# Patient Record
Sex: MALE | URBAN - METROPOLITAN AREA
[De-identification: names, ages, dates, MRNs, and addresses within clinical notes are randomized per-mention and may not be internally consistent; named-entity substitution may affect disease eponyms.]

---

## 2020-08-07 ENCOUNTER — HOSPITAL ENCOUNTER (OUTPATIENT)
Dept: LAB | Facility: MEDICAL CENTER | Age: 55
End: 2020-08-07
Payer: COMMERCIAL

## 2020-08-07 LAB
COVID ORDER STATUS COVID19: NORMAL
SARS-COV-2 RNA RESP QL NAA+PROBE: NOTDETECTED
SPECIMEN SOURCE: NORMAL

## 2024-06-16 ENCOUNTER — APPOINTMENT (OUTPATIENT)
Dept: RADIOLOGY | Facility: MEDICAL CENTER | Age: 59
DRG: 501 | End: 2024-06-16
Attending: BEHAVIOR ANALYST
Payer: COMMERCIAL

## 2024-06-16 ENCOUNTER — HOSPITAL ENCOUNTER (INPATIENT)
Facility: MEDICAL CENTER | Age: 59
LOS: 4 days | DRG: 501 | End: 2024-06-20
Attending: EMERGENCY MEDICINE | Admitting: FAMILY MEDICINE
Payer: COMMERCIAL

## 2024-06-16 ENCOUNTER — OFFICE VISIT (OUTPATIENT)
Dept: URGENT CARE | Facility: CLINIC | Age: 59
End: 2024-06-16
Payer: COMMERCIAL

## 2024-06-16 VITALS
DIASTOLIC BLOOD PRESSURE: 60 MMHG | OXYGEN SATURATION: 96 % | SYSTOLIC BLOOD PRESSURE: 114 MMHG | HEIGHT: 72 IN | WEIGHT: 262 LBS | RESPIRATION RATE: 13 BRPM | HEART RATE: 101 BPM | TEMPERATURE: 97.6 F | BODY MASS INDEX: 35.49 KG/M2

## 2024-06-16 DIAGNOSIS — L03.116 LEFT LEG CELLULITIS: ICD-10-CM

## 2024-06-16 DIAGNOSIS — L03.116 CELLULITIS OF LEFT LOWER EXTREMITY: ICD-10-CM

## 2024-06-16 DIAGNOSIS — R50.9 FEVER, UNSPECIFIED FEVER CAUSE: ICD-10-CM

## 2024-06-16 PROBLEM — L03.90 CELLULITIS: Status: ACTIVE | Noted: 2024-06-16

## 2024-06-16 LAB
ALBUMIN SERPL BCP-MCNC: 3.6 G/DL (ref 3.2–4.9)
ALBUMIN/GLOB SERPL: 1.1 G/DL
ALP SERPL-CCNC: 72 U/L (ref 30–99)
ALT SERPL-CCNC: 13 U/L (ref 2–50)
ANION GAP SERPL CALC-SCNC: 11 MMOL/L (ref 7–16)
AST SERPL-CCNC: 17 U/L (ref 12–45)
BASOPHILS # BLD AUTO: 0.2 % (ref 0–1.8)
BASOPHILS # BLD: 0.03 K/UL (ref 0–0.12)
BILIRUB SERPL-MCNC: 0.9 MG/DL (ref 0.1–1.5)
BUN SERPL-MCNC: 14 MG/DL (ref 8–22)
CALCIUM ALBUM COR SERPL-MCNC: 9.3 MG/DL (ref 8.5–10.5)
CALCIUM SERPL-MCNC: 9 MG/DL (ref 8.5–10.5)
CHLORIDE SERPL-SCNC: 104 MMOL/L (ref 96–112)
CO2 SERPL-SCNC: 23 MMOL/L (ref 20–33)
CREAT SERPL-MCNC: 0.78 MG/DL (ref 0.5–1.4)
CRP SERPL HS-MCNC: 23.88 MG/DL (ref 0–0.75)
EOSINOPHIL # BLD AUTO: 0.12 K/UL (ref 0–0.51)
EOSINOPHIL NFR BLD: 0.7 % (ref 0–6.9)
ERYTHROCYTE [DISTWIDTH] IN BLOOD BY AUTOMATED COUNT: 39.8 FL (ref 35.9–50)
GFR SERPLBLD CREATININE-BSD FMLA CKD-EPI: 103 ML/MIN/1.73 M 2
GLOBULIN SER CALC-MCNC: 3.2 G/DL (ref 1.9–3.5)
GLUCOSE SERPL-MCNC: 117 MG/DL (ref 65–99)
HCT VFR BLD AUTO: 40.7 % (ref 42–52)
HGB BLD-MCNC: 14.1 G/DL (ref 14–18)
IMM GRANULOCYTES # BLD AUTO: 0.06 K/UL (ref 0–0.11)
IMM GRANULOCYTES NFR BLD AUTO: 0.4 % (ref 0–0.9)
LYMPHOCYTES # BLD AUTO: 1.06 K/UL (ref 1–4.8)
LYMPHOCYTES NFR BLD: 6.5 % (ref 22–41)
MCH RBC QN AUTO: 30.3 PG (ref 27–33)
MCHC RBC AUTO-ENTMCNC: 34.6 G/DL (ref 32.3–36.5)
MCV RBC AUTO: 87.5 FL (ref 81.4–97.8)
MONOCYTES # BLD AUTO: 1.45 K/UL (ref 0–0.85)
MONOCYTES NFR BLD AUTO: 8.9 % (ref 0–13.4)
NEUTROPHILS # BLD AUTO: 13.59 K/UL (ref 1.82–7.42)
NEUTROPHILS NFR BLD: 83.3 % (ref 44–72)
NRBC # BLD AUTO: 0 K/UL
NRBC BLD-RTO: 0 /100 WBC (ref 0–0.2)
PLATELET # BLD AUTO: 200 K/UL (ref 164–446)
PMV BLD AUTO: 10.2 FL (ref 9–12.9)
POTASSIUM SERPL-SCNC: 3.5 MMOL/L (ref 3.6–5.5)
PROT SERPL-MCNC: 6.8 G/DL (ref 6–8.2)
RBC # BLD AUTO: 4.65 M/UL (ref 4.7–6.1)
SODIUM SERPL-SCNC: 138 MMOL/L (ref 135–145)
WBC # BLD AUTO: 16.3 K/UL (ref 4.8–10.8)

## 2024-06-16 PROCEDURE — 99285 EMERGENCY DEPT VISIT HI MDM: CPT

## 2024-06-16 PROCEDURE — 700111 HCHG RX REV CODE 636 W/ 250 OVERRIDE (IP): Performed by: EMERGENCY MEDICINE

## 2024-06-16 PROCEDURE — 36415 COLL VENOUS BLD VENIPUNCTURE: CPT

## 2024-06-16 PROCEDURE — 3078F DIAST BP <80 MM HG: CPT | Performed by: PHYSICIAN ASSISTANT

## 2024-06-16 PROCEDURE — 700111 HCHG RX REV CODE 636 W/ 250 OVERRIDE (IP): Mod: JZ | Performed by: BEHAVIOR ANALYST

## 2024-06-16 PROCEDURE — 3074F SYST BP LT 130 MM HG: CPT | Performed by: PHYSICIAN ASSISTANT

## 2024-06-16 PROCEDURE — 80053 COMPREHEN METABOLIC PANEL: CPT

## 2024-06-16 PROCEDURE — 700105 HCHG RX REV CODE 258: Performed by: EMERGENCY MEDICINE

## 2024-06-16 PROCEDURE — 87641 MR-STAPH DNA AMP PROBE: CPT

## 2024-06-16 PROCEDURE — 99205 OFFICE O/P NEW HI 60 MIN: CPT | Performed by: PHYSICIAN ASSISTANT

## 2024-06-16 PROCEDURE — 99222 1ST HOSP IP/OBS MODERATE 55: CPT | Mod: GC | Performed by: FAMILY MEDICINE

## 2024-06-16 PROCEDURE — 700105 HCHG RX REV CODE 258: Performed by: BEHAVIOR ANALYST

## 2024-06-16 PROCEDURE — 73590 X-RAY EXAM OF LOWER LEG: CPT | Mod: LT

## 2024-06-16 PROCEDURE — 86140 C-REACTIVE PROTEIN: CPT

## 2024-06-16 PROCEDURE — 85025 COMPLETE CBC W/AUTO DIFF WBC: CPT

## 2024-06-16 PROCEDURE — 87040 BLOOD CULTURE FOR BACTERIA: CPT

## 2024-06-16 PROCEDURE — 96365 THER/PROPH/DIAG IV INF INIT: CPT

## 2024-06-16 PROCEDURE — 700102 HCHG RX REV CODE 250 W/ 637 OVERRIDE(OP): Performed by: BEHAVIOR ANALYST

## 2024-06-16 PROCEDURE — A9270 NON-COVERED ITEM OR SERVICE: HCPCS | Performed by: BEHAVIOR ANALYST

## 2024-06-16 PROCEDURE — 770006 HCHG ROOM/CARE - MED/SURG/GYN SEMI*

## 2024-06-16 RX ORDER — METAXALONE 800 MG/1
800 TABLET ORAL 3 TIMES DAILY
COMMUNITY
Start: 2024-06-10

## 2024-06-16 RX ORDER — SODIUM CHLORIDE 9 MG/ML
500 INJECTION, SOLUTION INTRAVENOUS ONCE
Status: COMPLETED | OUTPATIENT
Start: 2024-06-16 | End: 2024-06-17

## 2024-06-16 RX ORDER — IBUPROFEN 200 MG
800 TABLET ORAL EVERY 8 HOURS PRN
COMMUNITY

## 2024-06-16 RX ORDER — ACETAMINOPHEN 325 MG/1
650 TABLET ORAL EVERY 6 HOURS PRN
Status: DISCONTINUED | OUTPATIENT
Start: 2024-06-16 | End: 2024-06-20 | Stop reason: HOSPADM

## 2024-06-16 RX ORDER — IBUPROFEN 400 MG/1
400 TABLET ORAL EVERY 6 HOURS PRN
Status: DISCONTINUED | OUTPATIENT
Start: 2024-06-16 | End: 2024-06-20 | Stop reason: HOSPADM

## 2024-06-16 RX ORDER — ENOXAPARIN SODIUM 100 MG/ML
40 INJECTION SUBCUTANEOUS DAILY
Status: DISCONTINUED | OUTPATIENT
Start: 2024-06-16 | End: 2024-06-20 | Stop reason: HOSPADM

## 2024-06-16 RX ADMIN — CEFAZOLIN 2 G: 2 INJECTION, POWDER, FOR SOLUTION INTRAMUSCULAR; INTRAVENOUS at 18:13

## 2024-06-16 RX ADMIN — SODIUM CHLORIDE 500 ML: 9 INJECTION, SOLUTION INTRAVENOUS at 23:37

## 2024-06-16 RX ADMIN — ACETAMINOPHEN 650 MG: 325 TABLET, FILM COATED ORAL at 23:33

## 2024-06-16 RX ADMIN — ENOXAPARIN SODIUM 40 MG: 100 INJECTION SUBCUTANEOUS at 21:08

## 2024-06-16 SDOH — ECONOMIC STABILITY: TRANSPORTATION INSECURITY
IN THE PAST 12 MONTHS, HAS LACK OF RELIABLE TRANSPORTATION KEPT YOU FROM MEDICAL APPOINTMENTS, MEETINGS, WORK OR FROM GETTING THINGS NEEDED FOR DAILY LIVING?: NO

## 2024-06-16 SDOH — ECONOMIC STABILITY: TRANSPORTATION INSECURITY
IN THE PAST 12 MONTHS, HAS THE LACK OF TRANSPORTATION KEPT YOU FROM MEDICAL APPOINTMENTS OR FROM GETTING MEDICATIONS?: NO

## 2024-06-16 ASSESSMENT — ENCOUNTER SYMPTOMS
VOMITING: 0
EYE PAIN: 0
NAUSEA: 0
COUGH: 0
CHILLS: 0
SORE THROAT: 0
FEVER: 1
MYALGIAS: 0
ABDOMINAL PAIN: 0
HEADACHES: 0
DIARRHEA: 0
SHORTNESS OF BREATH: 0
CONSTIPATION: 0

## 2024-06-16 ASSESSMENT — COGNITIVE AND FUNCTIONAL STATUS - GENERAL
DAILY ACTIVITIY SCORE: 24
SUGGESTED CMS G CODE MODIFIER MOBILITY: CH
SUGGESTED CMS G CODE MODIFIER DAILY ACTIVITY: CH
MOBILITY SCORE: 24

## 2024-06-16 ASSESSMENT — SOCIAL DETERMINANTS OF HEALTH (SDOH)
WITHIN THE LAST YEAR, HAVE YOU BEEN HUMILIATED OR EMOTIONALLY ABUSED IN OTHER WAYS BY YOUR PARTNER OR EX-PARTNER?: NO
IN THE PAST 12 MONTHS, HAS THE ELECTRIC, GAS, OIL, OR WATER COMPANY THREATENED TO SHUT OFF SERVICE IN YOUR HOME?: NO
WITHIN THE LAST YEAR, HAVE TO BEEN RAPED OR FORCED TO HAVE ANY KIND OF SEXUAL ACTIVITY BY YOUR PARTNER OR EX-PARTNER?: NO
WITHIN THE PAST 12 MONTHS, YOU WORRIED THAT YOUR FOOD WOULD RUN OUT BEFORE YOU GOT THE MONEY TO BUY MORE: NEVER TRUE
WITHIN THE PAST 12 MONTHS, THE FOOD YOU BOUGHT JUST DIDN'T LAST AND YOU DIDN'T HAVE MONEY TO GET MORE: NEVER TRUE
WITHIN THE LAST YEAR, HAVE YOU BEEN AFRAID OF YOUR PARTNER OR EX-PARTNER?: NO
WITHIN THE LAST YEAR, HAVE YOU BEEN KICKED, HIT, SLAPPED, OR OTHERWISE PHYSICALLY HURT BY YOUR PARTNER OR EX-PARTNER?: NO

## 2024-06-16 ASSESSMENT — LIFESTYLE VARIABLES
CONSUMPTION TOTAL: NEGATIVE
EVER HAD A DRINK FIRST THING IN THE MORNING TO STEADY YOUR NERVES TO GET RID OF A HANGOVER: NO
HOW MANY TIMES IN THE PAST YEAR HAVE YOU HAD 5 OR MORE DRINKS IN A DAY: 0
HAVE PEOPLE ANNOYED YOU BY CRITICIZING YOUR DRINKING: NO
ALCOHOL_USE: NO
TOTAL SCORE: 0
ON A TYPICAL DAY WHEN YOU DRINK ALCOHOL HOW MANY DRINKS DO YOU HAVE: 0
AVERAGE NUMBER OF DAYS PER WEEK YOU HAVE A DRINK CONTAINING ALCOHOL: 0
TOTAL SCORE: 0
TOTAL SCORE: 0
HAVE YOU EVER FELT YOU SHOULD CUT DOWN ON YOUR DRINKING: NO
EVER FELT BAD OR GUILTY ABOUT YOUR DRINKING: NO

## 2024-06-16 ASSESSMENT — PATIENT HEALTH QUESTIONNAIRE - PHQ9
1. LITTLE INTEREST OR PLEASURE IN DOING THINGS: NOT AT ALL
2. FEELING DOWN, DEPRESSED, IRRITABLE, OR HOPELESS: NOT AT ALL
SUM OF ALL RESPONSES TO PHQ9 QUESTIONS 1 AND 2: 0

## 2024-06-16 ASSESSMENT — PAIN DESCRIPTION - PAIN TYPE
TYPE: ACUTE PAIN
TYPE: ACUTE PAIN

## 2024-06-17 ENCOUNTER — APPOINTMENT (OUTPATIENT)
Dept: RADIOLOGY | Facility: MEDICAL CENTER | Age: 59
DRG: 501 | End: 2024-06-17
Payer: COMMERCIAL

## 2024-06-17 PROBLEM — T78.40XA HYPERSENSITIVITY REACTION: Status: ACTIVE | Noted: 2024-06-17

## 2024-06-17 LAB
ANION GAP SERPL CALC-SCNC: 10 MMOL/L (ref 7–16)
BASOPHILS # BLD AUTO: 0.3 % (ref 0–1.8)
BASOPHILS # BLD: 0.04 K/UL (ref 0–0.12)
BUN SERPL-MCNC: 12 MG/DL (ref 8–22)
CALCIUM SERPL-MCNC: 8.6 MG/DL (ref 8.5–10.5)
CHLORIDE SERPL-SCNC: 106 MMOL/L (ref 96–112)
CO2 SERPL-SCNC: 20 MMOL/L (ref 20–33)
CREAT SERPL-MCNC: 0.74 MG/DL (ref 0.5–1.4)
EOSINOPHIL # BLD AUTO: 0.12 K/UL (ref 0–0.51)
EOSINOPHIL NFR BLD: 0.9 % (ref 0–6.9)
ERYTHROCYTE [DISTWIDTH] IN BLOOD BY AUTOMATED COUNT: 39 FL (ref 35.9–50)
GFR SERPLBLD CREATININE-BSD FMLA CKD-EPI: 105 ML/MIN/1.73 M 2
GLUCOSE SERPL-MCNC: 105 MG/DL (ref 65–99)
HCT VFR BLD AUTO: 39.6 % (ref 42–52)
HGB BLD-MCNC: 14 G/DL (ref 14–18)
IMM GRANULOCYTES # BLD AUTO: 0.08 K/UL (ref 0–0.11)
IMM GRANULOCYTES NFR BLD AUTO: 0.6 % (ref 0–0.9)
LYMPHOCYTES # BLD AUTO: 1.1 K/UL (ref 1–4.8)
LYMPHOCYTES NFR BLD: 8.3 % (ref 22–41)
MCH RBC QN AUTO: 30.4 PG (ref 27–33)
MCHC RBC AUTO-ENTMCNC: 35.4 G/DL (ref 32.3–36.5)
MCV RBC AUTO: 85.9 FL (ref 81.4–97.8)
MONOCYTES # BLD AUTO: 1.07 K/UL (ref 0–0.85)
MONOCYTES NFR BLD AUTO: 8.1 % (ref 0–13.4)
NEUTROPHILS # BLD AUTO: 10.83 K/UL (ref 1.82–7.42)
NEUTROPHILS NFR BLD: 81.8 % (ref 44–72)
NRBC # BLD AUTO: 0 K/UL
NRBC BLD-RTO: 0 /100 WBC (ref 0–0.2)
PLATELET # BLD AUTO: 203 K/UL (ref 164–446)
PMV BLD AUTO: 10.5 FL (ref 9–12.9)
POTASSIUM SERPL-SCNC: 3.5 MMOL/L (ref 3.6–5.5)
RBC # BLD AUTO: 4.61 M/UL (ref 4.7–6.1)
SCCMEC + MECA PNL NOSE NAA+PROBE: NEGATIVE
SODIUM SERPL-SCNC: 136 MMOL/L (ref 135–145)
WBC # BLD AUTO: 13.2 K/UL (ref 4.8–10.8)

## 2024-06-17 PROCEDURE — 700105 HCHG RX REV CODE 258: Performed by: BEHAVIOR ANALYST

## 2024-06-17 PROCEDURE — 36415 COLL VENOUS BLD VENIPUNCTURE: CPT

## 2024-06-17 PROCEDURE — 76882 US LMTD JT/FCL EVL NVASC XTR: CPT | Mod: LT

## 2024-06-17 PROCEDURE — A9270 NON-COVERED ITEM OR SERVICE: HCPCS | Performed by: BEHAVIOR ANALYST

## 2024-06-17 PROCEDURE — A9270 NON-COVERED ITEM OR SERVICE: HCPCS

## 2024-06-17 PROCEDURE — 85025 COMPLETE CBC W/AUTO DIFF WBC: CPT

## 2024-06-17 PROCEDURE — 76705 ECHO EXAM OF ABDOMEN: CPT

## 2024-06-17 PROCEDURE — 700102 HCHG RX REV CODE 250 W/ 637 OVERRIDE(OP)

## 2024-06-17 PROCEDURE — 99232 SBSQ HOSP IP/OBS MODERATE 35: CPT | Mod: GC | Performed by: FAMILY MEDICINE

## 2024-06-17 PROCEDURE — 770006 HCHG ROOM/CARE - MED/SURG/GYN SEMI*

## 2024-06-17 PROCEDURE — 80048 BASIC METABOLIC PNL TOTAL CA: CPT

## 2024-06-17 PROCEDURE — 97602 WOUND(S) CARE NON-SELECTIVE: CPT

## 2024-06-17 PROCEDURE — 700102 HCHG RX REV CODE 250 W/ 637 OVERRIDE(OP): Performed by: BEHAVIOR ANALYST

## 2024-06-17 PROCEDURE — 700111 HCHG RX REV CODE 636 W/ 250 OVERRIDE (IP): Mod: JZ | Performed by: BEHAVIOR ANALYST

## 2024-06-17 RX ORDER — POTASSIUM CHLORIDE 20 MEQ/1
40 TABLET, EXTENDED RELEASE ORAL ONCE
Status: COMPLETED | OUTPATIENT
Start: 2024-06-17 | End: 2024-06-17

## 2024-06-17 RX ORDER — POTASSIUM CHLORIDE 20 MEQ/1
40 TABLET, EXTENDED RELEASE ORAL ONCE
Status: ACTIVE | OUTPATIENT
Start: 2024-06-17 | End: 2024-06-18

## 2024-06-17 RX ORDER — FUROSEMIDE 20 MG/1
20 TABLET ORAL ONCE
Status: COMPLETED | OUTPATIENT
Start: 2024-06-17 | End: 2024-06-17

## 2024-06-17 RX ORDER — CEPHALEXIN 500 MG/1
1000 CAPSULE ORAL 3 TIMES DAILY
Status: DISCONTINUED | OUTPATIENT
Start: 2024-06-17 | End: 2024-06-18

## 2024-06-17 RX ORDER — DIPHENHYDRAMINE HCL 25 MG
25 TABLET ORAL EVERY 6 HOURS PRN
Status: DISCONTINUED | OUTPATIENT
Start: 2024-06-17 | End: 2024-06-18

## 2024-06-17 RX ORDER — SULFAMETHOXAZOLE AND TRIMETHOPRIM 800; 160 MG/1; MG/1
1 TABLET ORAL EVERY 12 HOURS
Status: DISCONTINUED | OUTPATIENT
Start: 2024-06-17 | End: 2024-06-17

## 2024-06-17 RX ADMIN — DIPHENHYDRAMINE HYDROCHLORIDE 25 MG: 25 TABLET ORAL at 09:01

## 2024-06-17 RX ADMIN — IBUPROFEN 400 MG: 400 TABLET, FILM COATED ORAL at 04:21

## 2024-06-17 RX ADMIN — ENOXAPARIN SODIUM 40 MG: 100 INJECTION SUBCUTANEOUS at 16:33

## 2024-06-17 RX ADMIN — FUROSEMIDE 20 MG: 20 TABLET ORAL at 10:30

## 2024-06-17 RX ADMIN — POTASSIUM CHLORIDE 40 MEQ: 1500 TABLET, EXTENDED RELEASE ORAL at 10:30

## 2024-06-17 RX ADMIN — CEPHALEXIN 1000 MG: 500 CAPSULE ORAL at 20:34

## 2024-06-17 RX ADMIN — CEPHALEXIN 1000 MG: 500 CAPSULE ORAL at 14:22

## 2024-06-17 RX ADMIN — IBUPROFEN 400 MG: 400 TABLET, FILM COATED ORAL at 16:32

## 2024-06-17 RX ADMIN — CEFAZOLIN 2 G: 2 INJECTION, POWDER, FOR SOLUTION INTRAMUSCULAR; INTRAVENOUS at 04:14

## 2024-06-17 ASSESSMENT — PAIN DESCRIPTION - PAIN TYPE: TYPE: ACUTE PAIN

## 2024-06-17 ASSESSMENT — SOCIAL DETERMINANTS OF HEALTH (SDOH)
IN THE PAST 12 MONTHS, HAS THE ELECTRIC, GAS, OIL, OR WATER COMPANY THREATENED TO SHUT OFF SERVICE IN YOUR HOME?: NO
WITHIN THE PAST 12 MONTHS, THE FOOD YOU BOUGHT JUST DIDN'T LAST AND YOU DIDN'T HAVE MONEY TO GET MORE: NEVER TRUE
WITHIN THE PAST 12 MONTHS, YOU WORRIED THAT YOUR FOOD WOULD RUN OUT BEFORE YOU GOT THE MONEY TO BUY MORE: NEVER TRUE

## 2024-06-17 NOTE — PROGRESS NOTES
[facility] PROGRESS NOTE     Attending: Josselyn Boss M.d.  Senior Resident: Annie Lockwood M.D. (PGY-2)  Jose Resident: Kimmy Watson M.D. (PGY-1)  PATIENT: Alton Koehler; [id number]; 1965    Hospital Day # Hospital Day: 2    ID: 58 y.o. male with no significant past medical history was admitted on 6/16/2024   for left lower extremity cellulitis.    24 Hour Events: No acute events overnight    Subjective: Patient reports noticing a new rash over his bilateral upper extremities this morning. Consists of slightly raised, red, circular lesions, some with central clearing. He denies any itching, pain, warmth over the area. Denies any throat or lip swelling, difficulty breathing, lightheadedness, palpitations, chest pain. Otherwise continues to have redness, swelling, and pain over the left shin area, patient states it appears and feels stable from admission. Denies any tenderness, discoloration, or swelling over the left calf. No further fevers, chills, nausea, vomiting.     OBJECTIVE:  Temp:  [36.2 °C (97.2 °F)-37.8 °C (100 °F)] 37.1 °C (98.7 °F)  Pulse:  [] 92  Resp:  [13-18] 17  BP: (111-134)/(60-78) 114/70  SpO2:  [90 %-100 %] 90 %    Intake/Output Summary (Last 24 hours) at 6/17/2024 0907  Last data filed at 6/16/2024 1904  Gross per 24 hour   Intake 100 ml   Output --   Net 100 ml       PE:  Gen: No acute distress, resting comfortably in bed  HEENT: normocephalic, atraumatic, EOMI  Pulm: clear to auscultation bilaterally, no respiratory distress   Cardio: RRR, no M/R/G  Abdom: soft, nontender, nondistended, normoactive bowel sounds in all quadrants  Ext: No edema, 2+ pulses bilaterally  Skin: Maculopapular rash with central clearing noted over the bilateral upper extremities, chest, and left lower extremity, faintly erythematous, no increased warmth, no tenderness to palpation or excoriations. Left lower extremity erythema and edema within the previously drawn demarcations from  "admission, tenderness to palpation over area.   Neuro: Grossly intact    LABS:  Recent Labs     06/16/24  1800 06/17/24  0310   WBC 16.3* 13.2*   RBC 4.65* 4.61*   HEMOGLOBIN 14.1 14.0   HEMATOCRIT 40.7* 39.6*   MCV 87.5 85.9   MCH 30.3 30.4   RDW 39.8 39.0   PLATELETCT 200 203   MPV 10.2 10.5   NEUTSPOLYS 83.30* 81.80*   LYMPHOCYTES 6.50* 8.30*   MONOCYTES 8.90 8.10   EOSINOPHILS 0.70 0.90   BASOPHILS 0.20 0.30     Recent Labs     06/16/24  1800 06/17/24  0310   SODIUM 138 136   POTASSIUM 3.5* 3.5*   CHLORIDE 104 106   CO2 23 20   BUN 14 12   CREATININE 0.78 0.74   CALCIUM 9.0 8.6   ALBUMIN 3.6  --      Estimated GFR/CRCL = Estimated Creatinine Clearance: 146.7 mL/min (by C-G formula based on SCr of 0.74 mg/dL).  Recent Labs     06/16/24  1800 06/17/24  0310   GLUCOSE 117* 105*     Recent Labs     06/16/24  1800   ASTSGOT 17   ALTSGPT 13   TBILIRUBIN 0.9   ALKPHOSPHAT 72   GLOBULIN 3.2             No results for input(s): \"INR\", \"APTT\", \"FIBRINOGEN\" in the last 72 hours.    Invalid input(s): \"DIMER\"    MICROBIOLOGY:   No results found for: \"BLOODCULTU\", \"BLDCULT\", \"BCHOLD\"     IMAGING:   DX-TIBIA AND FIBULA LEFT   Final Result      1.  No acute osseous abnormality detected. Diffuse soft tissue swelling.      US-EXTREMITY NON VASCULAR UNILATERAL LEFT    (Results Pending)       MEDS:  Current Facility-Administered Medications   Medication Last Admin    diphenhydrAMINE (Benadryl) tablet/capsule 25 mg 25 mg at 06/17/24 0901    cephALEXin (Keflex) capsule 1,000 mg 1,000 mg at 06/17/24 1422    potassium chloride SA (Kdur) tablet 40 mEq      enoxaparin (Lovenox) inj 40 mg 40 mg at 06/16/24 2108    acetaminophen (Tylenol) tablet 650 mg 650 mg at 06/16/24 2333    ibuprofen (Motrin) tablet 400 mg 400 mg at 06/17/24 0421       PROBLEM LIST:  Problem Noted   Hypersensitivity Reaction 6/17/2024   Cellulitis 6/16/2024       ASSESSMENT/PLAN: 58 y.o. male with no significant past medical history was admitted on 6/16/2024 for left " lower extremity cellulitis.    * Cellulitis- (present on admission)  Assessment & Plan  4-day history of slowly spreading erythema and edema from left tibial tuberosity after bumping area on table, associated with fevers, chills, nausea with leukocytosis on admission and elevated CRP.  Suspect at this time most likely left lower extremity cellulitis, low concern for DVT given no calf pain or discoloration and no recent immobilization, also low concern for compartment syndrome.  Left tibia/fibula x-ray showed no osseous abnormality, evidence of soft tissue swelling.  Leukocytosis improving with IV antibiotics.  MRSA nares negative.  Patient mildly tachycardic on admission however has had stable vital signs since then.  Afebrile.  -Patient initiated on IV Ancef 2 g every 8 hours for 5-day total course on admission.  Will switch to oral Cephalexin given most likely hypersensitivity reaction (see details below).  - Blood cultures taken in ED show no growth to date.  Will continue to monitor.  - Soft tissue ultrasound ordered to rule out abscess  - One-time oral 40 mg Lasix ordered for significant lower extremity edema.  - Monitor daily CBC  -Monitor inpatient for symptomatic improvement and following blood cultures      Hypersensitivity reaction  Assessment & Plan  Patient with new maculopapular rash with central clearing noted over bilateral upper extremities, chest, left lower extremity after initiation of Ancef for left lower extremity cellulitis.  Suspicion at this time is for hypersensitivity reaction to Ancef.  Discussed with pharmacy.  - Ordered Benadryl p.o. 25 mg every 6 hours as needed for rash  - Discussed with pharmacy, recommend switching from Ancef to cephalexin due to low concern for cross-reactivity and hypersensitivity reaction to cephalexin.      Core Measures:  Fluids: PO intake  Lines: PIV  Abx: Bactrim  Diet: Regular  PPX: SCDs/TEDs, Lovenox      #DISPOSITION  - Inpatient for symptomatic improvement  of cellulitis and monitoring of blood cultures to r/o bacteremia    Kimmy Watson M.D.  PGY-1  [facility]

## 2024-06-17 NOTE — PROGRESS NOTES
4 Eyes Skin Assessment Completed by PK Garvin and PK Oneal.    Head WDL  Ears WDL  Nose WDL  Mouth WDL  Neck WDL  Breast/Chest WDL  Shoulder Blades WDL  Spine WDL  (R) Arm/Elbow/Hand Redness and Blanching  (L) Arm/Elbow/Hand Redness and Blanching  Abdomen WDL  Groin WDL  Scrotum/Coccyx/Buttocks WDL  (R) Leg WDL  (L) Leg Redness and Swelling  (R) Heel/Foot/Toe Dry  (L) Heel/Foot/Toe Dry          Devices In Places Blood Pressure Cuff      Interventions In Place Pillows    Possible Skin Injury Yes    Pictures Uploaded Into Epic Yes  Wound Consult Placed YES  RN Wound Prevention Protocol Ordered Yes

## 2024-06-17 NOTE — ASSESSMENT & PLAN NOTE
Patient with continued maculopapular rash noted over bilateral upper extremities, chest, left lower extremity after initiation of Ancef for left lower extremity cellulitis.  Suspicion at this time is for hypersensitivity reaction to Ancef.  Discussed with pharmacy.  - Continue Benadryl IV 25 mg every 6 hours as needed for rash  - Due to continued rash while on cephalexin, switched to oral Bactrim. Will continue.

## 2024-06-17 NOTE — ED PROVIDER NOTES
ED Provider Note    CHIEF COMPLAINT  Chief Complaint   Patient presents with    Leg Swelling     Pt was sent from  for left leg cellulitis, pt reports fever, body aches, chills. Left leg red, warm to touch       EXTERNAL RECORDS REVIEWED  Other reviewed a note from the urgent care from today the patient presented with pain and swelling to the left leg.  The patient has associated fevers and he was encouraged to come to the emergency department for possible bacteremia and early sepsis.    HPI/ROS    Alton Koehler is a 58 y.o. male who presents with left leg pain and swelling.  The patient states on Thursday he fell striking his leg.  That night he started having some fevers and the fevers resolved.  However since then he has been having progressive swelling and erythema to the left leg with continued fevers.  The patient has not had any vomiting but has had some nausea and chills.  He did have a COVID test at urgent care that was negative.    PAST MEDICAL HISTORY       SURGICAL HISTORY  patient denies any surgical history    FAMILY HISTORY  History reviewed. No pertinent family history.    SOCIAL HISTORY  Social History     Tobacco Use    Smoking status: Never    Smokeless tobacco: Never   Vaping Use    Vaping status: Every Day    Substances: Nicotine   Substance and Sexual Activity    Alcohol use: Never    Drug use: Never    Sexual activity: Not on file       CURRENT MEDICATIONS  Home Medications       Reviewed by Esther Velázquez R.N. (Registered Nurse) on 06/16/24 at 1732  Med List Status: Not Addressed     Medication Last Dose Status   metaxalone (SKELAXIN) 800 MG Tab  Active                    ALLERGIES  No Known Allergies    PHYSICAL EXAM  VITAL SIGNS: /68   Pulse (!) 103   Temp 36.2 °C (97.2 °F) (Temporal)   Resp 18   Ht 1.829 m (6')   Wt 119 kg (262 lb 5.6 oz)   SpO2 97%   BMI 35.58 kg/m²    In general the patient appears uncomfortable but nontoxic    HEENT unremarkable    Pulmonary  the patient's lungs are clear to auscultation bilaterally    Cardiovascular S1-S2 with a tachycardic rate    GI abdomen soft    Extremities patient does have some erythema and warmth to the left leg anteriorly over the tibia with no fluctuance    Skin erythema described above with no centralized fluctuance nor open wound    Neurovascular examination is grossly intact to the lower extremities    EKG/LABS  Results for orders placed or performed during the hospital encounter of 06/16/24   CBC WITH DIFFERENTIAL   Result Value Ref Range    WBC 16.3 (H) 4.8 - 10.8 K/uL    RBC 4.65 (L) 4.70 - 6.10 M/uL    Hemoglobin 14.1 14.0 - 18.0 g/dL    Hematocrit 40.7 (L) 42.0 - 52.0 %    MCV 87.5 81.4 - 97.8 fL    MCH 30.3 27.0 - 33.0 pg    MCHC 34.6 32.3 - 36.5 g/dL    RDW 39.8 35.9 - 50.0 fL    Platelet Count 200 164 - 446 K/uL    MPV 10.2 9.0 - 12.9 fL    Neutrophils-Polys 83.30 (H) 44.00 - 72.00 %    Lymphocytes 6.50 (L) 22.00 - 41.00 %    Monocytes 8.90 0.00 - 13.40 %    Eosinophils 0.70 0.00 - 6.90 %    Basophils 0.20 0.00 - 1.80 %    Immature Granulocytes 0.40 0.00 - 0.90 %    Nucleated RBC 0.00 0.00 - 0.20 /100 WBC    Neutrophils (Absolute) 13.59 (H) 1.82 - 7.42 K/uL    Lymphs (Absolute) 1.06 1.00 - 4.80 K/uL    Monos (Absolute) 1.45 (H) 0.00 - 0.85 K/uL    Eos (Absolute) 0.12 0.00 - 0.51 K/uL    Baso (Absolute) 0.03 0.00 - 0.12 K/uL    Immature Granulocytes (abs) 0.06 0.00 - 0.11 K/uL    NRBC (Absolute) 0.00 K/uL   COMP METABOLIC PANEL   Result Value Ref Range    Sodium 138 135 - 145 mmol/L    Potassium 3.5 (L) 3.6 - 5.5 mmol/L    Chloride 104 96 - 112 mmol/L    Co2 23 20 - 33 mmol/L    Anion Gap 11.0 7.0 - 16.0    Glucose 117 (H) 65 - 99 mg/dL    Bun 14 8 - 22 mg/dL    Creatinine 0.78 0.50 - 1.40 mg/dL    Calcium 9.0 8.5 - 10.5 mg/dL    Correct Calcium 9.3 8.5 - 10.5 mg/dL    AST(SGOT) 17 12 - 45 U/L    ALT(SGPT) 13 2 - 50 U/L    Alkaline Phosphatase 72 30 - 99 U/L    Total Bilirubin 0.9 0.1 - 1.5 mg/dL    Albumin 3.6 3.2 -  4.9 g/dL    Total Protein 6.8 6.0 - 8.2 g/dL    Globulin 3.2 1.9 - 3.5 g/dL    A-G Ratio 1.1 g/dL   CRP QUANTITIVE (NON-CARDIAC)   Result Value Ref Range    Stat C-Reactive Protein 23.88 (H) 0.00 - 0.75 mg/dL   ESTIMATED GFR   Result Value Ref Range    GFR (CKD-EPI) 103 >60 mL/min/1.73 m 2       COURSE & MEDICAL DECISION MAKING    This a 58-year-old male who presents with significant cellulitis to the left lower extremity.  The patient is also been having fevers and chills and therefore laboratory analysis was ordered as well as blood cultures to evaluate for possible bacteremia and early sepsis.  The patient received Ancef intravenously.  Laboratory analysis does not show any evidence of acidosis but he does have a concerning significant elevation of the C-reactive protein as well as a leukocytosis concerning for possible bacteremia and early sepsis.  The patient will therefore be admitted to the hospital for further antibiotics until his cultures are negative.  FINAL DIAGNOSIS  1.  Left lower extremity cellulitis  2.  Fever rule out bacteremia and early sepsis    Disposition  The patient will be admitted in stable condition       Electronically signed by: Arcadio Rdz M.D., 6/16/2024 5:55 PM

## 2024-06-17 NOTE — CARE PLAN
The patient is Stable - Low risk of patient condition declining or worsening    Shift Goals  Clinical Goals: monitor for increased swelling, rashes, redness, safety  Patient Goals: comfort, go home  Family Goals: prabhjot    Axo4. Able to make needs known. VSS WNL. Denies pain. Due medications given as ordered. Hourly rounds done. Able to ambulate via self. Afebrile. Seen with left lower leg redness. Remains free from injury or falls. Call light and personal belongings within reach. Needs met at this time.    Progress made toward(s) clinical / shift goals:      Problem: Pain - Standard  Goal: Alleviation of pain or a reduction in pain to the patient’s comfort goal  Outcome: Progressing  Flowsheets (Taken 6/17/2024 0755 by Meghana Zelaya, Student)  Pain Rating Scale (NPRS): 3  Note: Pt states pain is tolerable with no medication. Reposition offered pt able to reposition by self     Problem: Knowledge Deficit - Standard  Goal: Patient and family/care givers will demonstrate understanding of plan of care, disease process/condition, diagnostic tests and medications  6/17/2024 1436 by Avi Bess R.N.  Note: Pt and significant other is agreeable and on board with the plan of care.   6/17/2024 1429 by Avi Bess R.N.  Outcome: Progressing     Problem: Nutrition  Goal: Patient's nutritional and fluid intake will be adequate or improve  Outcome: Progressing  Flowsheets (Taken 6/17/2024 1430)  Oral Nutrition:   Breakfast   Lunch   Between % Consumed  Note: P.O. intake is adequate. Sitting up on edge of bed for meals.      Problem: Mobility  Goal: Patient's capacity to carry out activities will improve  Outcome: Progressing  Flowsheets (Taken 6/17/2024 0930)  Activity Performed:   Up to bathroom   Back to bed  Note: Pt able to ambulate and transfer with no assist. Pt ambulates with no assistive device.      Problem: Infection - Standard  Goal: Patient will remain free from infection  Outcome:  Progressing  Flowsheets (Taken 6/17/2024 1430)  Standard Infection Interventions:   Assessed for signs and symptoms of infection   Implemented standard precautions   Instructed patient/family on signs and symptoms of infection   Provided education on proper hand hygiene and infection prevention measures  Note: Pt on Abx therapy. Left lower leg appears red.  WBC:13

## 2024-06-17 NOTE — WOUND TEAM
[facility]  Inpatient Services  Wound and Skin Care Brief Evaluation    Admission Date: 6/16/2024     Last order of IP CONSULT TO WOUND CARE was found on 6/17/2024 from Hospital Encounter on 6/16/2024     HPI, PMH, SH: Reviewed    Chief Complaint   Patient presents with    Leg Swelling     Pt was sent from  for left leg cellulitis, pt reports fever, body aches, chills. Left leg red, warm to touch     Diagnosis: Cellulitis [L03.90]    Unit where seen by Wound Team: S601/01     Wound consult placed regarding bilateral elbows, legs, heels, and sacrum. Chart and images reviewed. This discussed with bedside RN, Lj. This clinician in to assess patient. Patient pleasant and agreeable. All areas assessed. Non-selectively debrided with Moist warm washcloth.     No pressure injuries or advanced wound care needs identified. Sacrum, bilateral elbows, and heels are normal coloration for skin tone, intact and blanching.  Left LE has cellulitis that is resolving with abx therapy.  No break in skin, no advanced wound care needs.  Tubigrib size F sent to bedside RN to apply. Wound consult completed. No further follow up unless indicated and consulted.          PREVENTATIVE INTERVENTIONS:    Q shift Lv - performed per nursing policy  Q shift pressure point assessments - performed per nursing policy    Surface/Positioning  Standard/trauma mattress - Currently in Place    Offloading/Redistribution  Float Heels off Bed with Pillows - Currently in Place           Respiratory  N/A    Containment/Moisture Prevention    N/A    Mobilization      Up to chair, Ambulate , and Ambulating at Baseline

## 2024-06-17 NOTE — CARE PLAN
The patient is Stable - Low risk of patient condition declining or worsening    Shift Goals  Clinical Goals: Pain management  Patient Goals: Rest  Family Goals: SERENA    Progress made toward(s) clinical / shift goals:  Patient complaints of pain ,NS 500cc bolus done,on IV abx, able to rest , call light within reach.     Patient is not progressing towards the following goals:

## 2024-06-17 NOTE — ASSESSMENT & PLAN NOTE
4-day history of slowly spreading erythema and edema from left tibial tuberosity after bumping area on table, associated with fevers, chills, nausea with leukocytosis on admission and elevated CRP.  Suspect at this time most likely left lower extremity cellulitis, low concern for DVT given no calf pain or discoloration and no recent immobilization, also low concern for compartment syndrome.  Left tibia/fibula x-ray showed no osseous abnormality, evidence of soft tissue swelling.  Leukocytosis improving with antibiotics.  MRSA nares negative.  Patient mildly tachycardic on admission however has had stable vital signs since then.  -Patient initiated on IV Ancef 2 g every 8 hours for 10-day total course on admission.  Switched to oral Cephalexin 6/17 given most likely hypersensitivity reaction.  Due to continued presence of maculopapular rash, switch to oral Bactrim 6/18 for 10-day total course.  - Blood cultures taken in ED show no growth to date.  Will continue to monitor.  - Soft tissue ultrasound ordered, showed 8.6 x 3.8 x 1.6 cm complicated fluid collection  - Due to concern for possible abscess and need for I&D, orthopedic surgery consulted.  No clinical improvement this morning over area of fluid collection, CT soft tissue ordered. Will reach out to orthopedic surgery again pending results.   - Monitor daily CBC  - Monitor inpatient for symptomatic improvement and following blood cultures

## 2024-06-17 NOTE — H&P
[facility] MEDICINE HISTORY AND PHYSICAL     PATIENT ID:  NAME:  Alton Koehler  MRN:               [id number]  YOB: 1965    Date of Admission: 6/16/2024     Attending: Dr. Boss    Resident: Clayton Araujo MD     Primary Care Physician:  Fredis    CC:  cellulitis    HPI: Alton Koehler is a 58 y.o. male w/ no sig pmh who presented with 4d hx of slowly spreading left leg erythema, edema, tenderness; a/w fever/chills some nausea;  -pt hit his left tibial tuberosity region on a coffee table prior to skin redness  -no numbness tingling, calf pain, cp, palpitations, sob/carmichael, FND, drainage, bleeding, weakness in limbs  -ex smoker quit 3yrs ago  -no etoh  -no drugs    ERCourse:  Wbc ~16; slight tachy at 103, other vss, bld clx pending, ancef started, crp elevated    REVIEW OF SYSTEMS:   Ten systems reviewed and were negative except as noted in the HPI.                PAST MEDICAL HISTORY:  History reviewed. No pertinent past medical history.    PAST SURGICAL HISTORY:  History reviewed. No pertinent surgical history.    FAMILY HISTORY:  History reviewed. No pertinent family history.    SOCIAL HISTORY:   See hpi    DIET:   Orders Placed This Encounter   Procedures    Diet Order Diet: Regular     Standing Status:   Standing     Number of Occurrences:   1     Order Specific Question:   Diet:     Answer:   Regular [1]       ALLERGIES:  No Known Allergies    OUTPATIENT MEDICATIONS:    Current Facility-Administered Medications:     enoxaparin (Lovenox) inj 40 mg, 40 mg, Subcutaneous, DAILY AT 1800, Clayton Araujo M.D., 40 mg at 06/16/24 2108    acetaminophen (Tylenol) tablet 650 mg, 650 mg, Oral, Q6HRS PRN, Clayton Araujo M.D., 650 mg at 06/16/24 2333    [START ON 6/17/2024] ceFAZolin (Ancef) 2 g in  mL IVPB, 2 g, Intravenous, Q8HRS, lCayton Araujo M.D.    PHYSICAL EXAM:  Vitals:    06/16/24 1733 06/16/24 1800 06/16/24 2044 06/16/24 2322   BP:  112/72 134/77 131/78   Pulse:  89 100 96   Resp:  16 18 18    Temp:   37.2 °C (99 °F) 37.8 °C (100 °F)   TempSrc:   Temporal Temporal   SpO2:  95% 98% 100%   Weight: 119 kg (262 lb 5.6 oz)      Height: 1.829 m (6')      , Temp (24hrs), Av.9 °C (98.5 °F), Min:36.2 °C (97.2 °F), Max:37.8 °C (100 °F)  , Pulse Oximetry: 100 %, O2 (LPM): 0, O2 Delivery Device: None - Room Air    General: Pt resting in NAD, cooperative   Skin:  Pink, warm and dry.  No rashes  HEENT: NC/AT. PERRL. EOMI. MMM. No nasal discharge.   Neck:  Supple   Lungs:  Symmetrical.  CTAB with no W/R/R.  Good air movement   Cardiovascular:  S1/S2 RRR without murmurs  Abdomen:  Abdomen is soft, nontender, nondistended. +BS. No masses noted.  Extremities:  left leg from infrapatellar region to ankle erythematous, edematous, ttp; all anteriorly; calf region unremarkable; neurovasc intact  Spine:  Straight without vertebral anomalies.  CNS:  Muscle tone is normal. No gross focal neurologic deficits      LAB TESTS:   Recent Labs     24  1800   WBC 16.3*   RBC 4.65*   HEMOGLOBIN 14.1   HEMATOCRIT 40.7*   MCV 87.5   MCH 30.3   RDW 39.8   PLATELETCT 200   MPV 10.2   NEUTSPOLYS 83.30*   LYMPHOCYTES 6.50*   MONOCYTES 8.90   EOSINOPHILS 0.70   BASOPHILS 0.20         Recent Labs     24  1800   SODIUM 138   POTASSIUM 3.5*   CHLORIDE 104   CO2 23   BUN 14   CREATININE 0.78   CALCIUM 9.0   ALBUMIN 3.6       CULTURES:   Results       Procedure Component Value Units Date/Time    MRSA By PCR (Amp) [[id number]]     Order Status: No result Specimen: Respirate from Nares     BLOOD CULTURE [[id number]] Collected: 24    Order Status: Sent Specimen: Blood from Peripheral Updated: 24    BLOOD CULTURE [[id number]] Collected: 24    Order Status: Sent Specimen: Blood from Peripheral Updated: 24            IMAGES:  DX-TIBIA AND FIBULA LEFT   Final Result      1.  No acute osseous abnormality detected. Diffuse soft tissue swelling.          CONSULTS:   N/a    ASSESSMENT/PLAN:   58 y.o.  male admitted for left leg cellulitis; no significant pmh.     * Cellulitis- (present on admission)  Assessment & Plan  #mild tachy    4d hx slow spreading erythema on left leg and pt bumped tibia on table; a/w fevers/chills and some nausea; leucocytosis on admission; neurovasc in tact; no calf pain or ttp; no crepitus; slightly tachy otherwise stable    DDx: cellulitis, concern for bacteremia; low concern for abscess at this time; dvt, compartment syndrome unlikely    Plan:  -Admit to med/surg  -f/u clx taken in ED  -cont ancef started in ED  -500cc ns bolus  -monitor vs  -cbc daily  -outline and document cellulitic pattern  -monitor rapid spread and vs  -will obtain MRSA nares, possibly change abx regimen        Abx:ancef  DVT prophylaxis: lovenox  Code Status: full    Dispo: admit and r/o bacteremia

## 2024-06-17 NOTE — PROGRESS NOTES
Subjective:   Alton Koehler is a 58 y.o. male who presents for Leg Injury (Sx started 3 days ago. Left side Shin injury, objects fell on top of shin, red swollen. )      This is a 58-year-old male who had a injury to his left shin around 3 days ago.  He has noted over the last 3 days progressively worsening redness warmth and pain as well as significant swelling of the left lower extremity.  Last night and one of the previous nights he noted a fever and diaphoresis, had a measured fever of 101.3, currently does not feel febrile.    Review of Systems   Constitutional:  Positive for fever. Negative for chills.   HENT:  Negative for congestion, ear pain and sore throat.    Eyes:  Negative for pain.   Respiratory:  Negative for cough and shortness of breath.    Cardiovascular:  Negative for chest pain.   Gastrointestinal:  Negative for abdominal pain, constipation, diarrhea, nausea and vomiting.   Genitourinary:  Negative for dysuria.   Musculoskeletal:  Negative for myalgias.   Skin:  Negative for rash.   Neurological:  Negative for headaches.       Medications, Allergies, and current problem list reviewed today in Epic.     Objective:     /60 (BP Location: Left arm, Patient Position: Sitting, BP Cuff Size: Adult long)   Pulse (!) 101   Temp 36.4 °C (97.6 °F) (Temporal)   Resp 13   Ht 1.829 m (6')   Wt 119 kg (262 lb)   SpO2 96%     Physical Exam  Vitals reviewed.   Constitutional:       Appearance: Normal appearance.   HENT:      Head: Normocephalic and atraumatic.      Right Ear: External ear normal.      Left Ear: External ear normal.      Nose: Nose normal.      Mouth/Throat:      Mouth: Mucous membranes are moist.   Eyes:      Conjunctiva/sclera: Conjunctivae normal.   Cardiovascular:      Rate and Rhythm: Normal rate.   Pulmonary:      Effort: Pulmonary effort is normal.   Skin:     General: Skin is warm and dry.      Capillary Refill: Capillary refill takes less than 2 seconds.      Comments:  Erythema, warmth and tenderness extending from above the knee to ankle left lower extremity, not completely circumferential.  No obvious streaking.  No pointing or fluctuance   Neurological:      Mental Status: He is alert and oriented to person, place, and time.         Assessment/Plan:     Diagnosis and associated orders:     1. Fever, unspecified fever cause        2. Cellulitis of left lower extremity           Comments/MDM:     Patient with impressive cellulitic infection which will require antibiotics however given his constitutional symptoms including reliably measured fever and concerned about a more severe or systemic infection that will not be adequately treated with oral antibiotic therapy I believe she may require higher level care including labs and parenteral antibiotics  Patient referred to the emergency room, contacted the [facility] [place] to alert them to his imminent arrival, his wife will drive him directly         Differential diagnosis, natural history, supportive care, and indications for immediate follow-up discussed.    Advised the patient to follow-up with the primary care physician for recheck, reevaluation, and consideration of further management.    Please note that this dictation was created using voice recognition software. I have made a reasonable attempt to correct obvious errors, but I expect that there are errors of grammar and possibly content that I did not discover before finalizing the note.    This note was electronically signed by Alton De Leon PA-C

## 2024-06-17 NOTE — ED NOTES
Medication history reviewed with patient at bedside.   Med rec is complete  Allergies reviewed.     Patient has not had any outpatient antibiotics in the last 30 days.   Anticoagulants: No    Saul Peñaloza

## 2024-06-17 NOTE — ED TRIAGE NOTES
Chief Complaint   Patient presents with    Leg Swelling     Pt was sent from  for left leg cellulitis, pt reports fever, body aches, chills. Left leg red, warm to touch     Pt ambulatory to triage for above complaint. Pt states 4 days ago he had fell on speaker cabinet injuring his left leg.      Pt is alert/oriented and follows commands. Pt speaking in full sentences and responds appropriately to questions. No acute distress noted in triage and respirations are even and unlabored.     Pt placed in lobby and educated on triage process. Pt encouraged to alert staff for any changes in condition.

## 2024-06-18 LAB
ANION GAP SERPL CALC-SCNC: 12 MMOL/L (ref 7–16)
BASOPHILS # BLD AUTO: 0.4 % (ref 0–1.8)
BASOPHILS # BLD: 0.04 K/UL (ref 0–0.12)
BUN SERPL-MCNC: 11 MG/DL (ref 8–22)
CALCIUM SERPL-MCNC: 8.9 MG/DL (ref 8.5–10.5)
CHLORIDE SERPL-SCNC: 105 MMOL/L (ref 96–112)
CO2 SERPL-SCNC: 22 MMOL/L (ref 20–33)
CREAT SERPL-MCNC: 0.73 MG/DL (ref 0.5–1.4)
EOSINOPHIL # BLD AUTO: 0.16 K/UL (ref 0–0.51)
EOSINOPHIL NFR BLD: 1.6 % (ref 0–6.9)
ERYTHROCYTE [DISTWIDTH] IN BLOOD BY AUTOMATED COUNT: 39.4 FL (ref 35.9–50)
GFR SERPLBLD CREATININE-BSD FMLA CKD-EPI: 106 ML/MIN/1.73 M 2
GLUCOSE SERPL-MCNC: 118 MG/DL (ref 65–99)
HCT VFR BLD AUTO: 39.7 % (ref 42–52)
HGB BLD-MCNC: 14.1 G/DL (ref 14–18)
IMM GRANULOCYTES # BLD AUTO: 0.05 K/UL (ref 0–0.11)
IMM GRANULOCYTES NFR BLD AUTO: 0.5 % (ref 0–0.9)
LYMPHOCYTES # BLD AUTO: 0.86 K/UL (ref 1–4.8)
LYMPHOCYTES NFR BLD: 8.5 % (ref 22–41)
MCH RBC QN AUTO: 30.7 PG (ref 27–33)
MCHC RBC AUTO-ENTMCNC: 35.5 G/DL (ref 32.3–36.5)
MCV RBC AUTO: 86.3 FL (ref 81.4–97.8)
MONOCYTES # BLD AUTO: 0.88 K/UL (ref 0–0.85)
MONOCYTES NFR BLD AUTO: 8.7 % (ref 0–13.4)
NEUTROPHILS # BLD AUTO: 8.16 K/UL (ref 1.82–7.42)
NEUTROPHILS NFR BLD: 80.3 % (ref 44–72)
NRBC # BLD AUTO: 0 K/UL
NRBC BLD-RTO: 0 /100 WBC (ref 0–0.2)
PLATELET # BLD AUTO: 241 K/UL (ref 164–446)
PMV BLD AUTO: 10.6 FL (ref 9–12.9)
POTASSIUM SERPL-SCNC: 3.8 MMOL/L (ref 3.6–5.5)
RBC # BLD AUTO: 4.6 M/UL (ref 4.7–6.1)
SODIUM SERPL-SCNC: 139 MMOL/L (ref 135–145)
WBC # BLD AUTO: 10.2 K/UL (ref 4.8–10.8)

## 2024-06-18 PROCEDURE — A9270 NON-COVERED ITEM OR SERVICE: HCPCS

## 2024-06-18 PROCEDURE — 85025 COMPLETE CBC W/AUTO DIFF WBC: CPT

## 2024-06-18 PROCEDURE — 80048 BASIC METABOLIC PNL TOTAL CA: CPT

## 2024-06-18 PROCEDURE — 99232 SBSQ HOSP IP/OBS MODERATE 35: CPT | Mod: GC | Performed by: FAMILY MEDICINE

## 2024-06-18 PROCEDURE — A9270 NON-COVERED ITEM OR SERVICE: HCPCS | Performed by: BEHAVIOR ANALYST

## 2024-06-18 PROCEDURE — 770006 HCHG ROOM/CARE - MED/SURG/GYN SEMI*

## 2024-06-18 PROCEDURE — 700102 HCHG RX REV CODE 250 W/ 637 OVERRIDE(OP)

## 2024-06-18 PROCEDURE — 36415 COLL VENOUS BLD VENIPUNCTURE: CPT

## 2024-06-18 PROCEDURE — 700111 HCHG RX REV CODE 636 W/ 250 OVERRIDE (IP)

## 2024-06-18 PROCEDURE — 700102 HCHG RX REV CODE 250 W/ 637 OVERRIDE(OP): Performed by: BEHAVIOR ANALYST

## 2024-06-18 RX ORDER — DIPHENHYDRAMINE HYDROCHLORIDE 50 MG/ML
25 INJECTION INTRAMUSCULAR; INTRAVENOUS EVERY 6 HOURS PRN
Status: DISCONTINUED | OUTPATIENT
Start: 2024-06-18 | End: 2024-06-20 | Stop reason: HOSPADM

## 2024-06-18 RX ORDER — SULFAMETHOXAZOLE AND TRIMETHOPRIM 800; 160 MG/1; MG/1
1 TABLET ORAL EVERY 12 HOURS
Status: DISCONTINUED | OUTPATIENT
Start: 2024-06-18 | End: 2024-06-20 | Stop reason: HOSPADM

## 2024-06-18 RX ADMIN — IBUPROFEN 400 MG: 400 TABLET, FILM COATED ORAL at 00:37

## 2024-06-18 RX ADMIN — ACETAMINOPHEN 650 MG: 325 TABLET, FILM COATED ORAL at 07:47

## 2024-06-18 RX ADMIN — CEPHALEXIN 1000 MG: 500 CAPSULE ORAL at 08:30

## 2024-06-18 RX ADMIN — IBUPROFEN 400 MG: 400 TABLET, FILM COATED ORAL at 16:16

## 2024-06-18 RX ADMIN — DIPHENHYDRAMINE HYDROCHLORIDE 25 MG: 50 INJECTION, SOLUTION INTRAMUSCULAR; INTRAVENOUS at 15:12

## 2024-06-18 RX ADMIN — DIPHENHYDRAMINE HYDROCHLORIDE 25 MG: 25 TABLET ORAL at 07:52

## 2024-06-18 RX ADMIN — SULFAMETHOXAZOLE AND TRIMETHOPRIM 1 TABLET: 800; 160 TABLET ORAL at 17:27

## 2024-06-18 ASSESSMENT — PAIN DESCRIPTION - PAIN TYPE
TYPE: ACUTE PAIN
TYPE: ACUTE PAIN

## 2024-06-18 NOTE — CARE PLAN
The patient is Stable - Low risk of patient condition declining or worsening    Shift Goals  Clinical Goals: Monitor for increase swelling of left leg  Patient Goals: Rest  Family Goals: SERENA    Progress made toward(s) clinical / shift goals:  Patient complaints of pain ,PRN medication given ,on oral abx, Tubigrip applied to left lower leg , able to rest , call light within reach.        Patient is not progressing towards the following goals:

## 2024-06-18 NOTE — PROGRESS NOTES
[facility] PROGRESS NOTE     Attending: Josselyn Boss M.d.  Senior Resident: Annie Lockwood M.D. (PGY-2)  Jose Resident: Kimmy Watson M.D. (PGY-1)  PATIENT: Alton Koehler; [id number]; 1965    Hospital Day # Hospital Day: 3    ID: 58 y.o. male with no significant past medical history was admitted on 6/16/2024   for left lower extremity cellulitis.    24 Hour Events: No acute events overnight    Subjective: Patient states he woke up and noticed that the flat, red rash over his bilateral upper extremities had returned and increased in distribution.  Also noted more of the rash over his bilateral lower extremities compared to yesterday.  Continues to not have any itching, pain, warmth associated with it.  Denies any chest pain, shortness of breath, abdominal pain.  Has had improvement in his lower extremity swelling.  Also has had improvement in the redness surrounding the bump on his left shin, however the bump itself continues to be red, swollen, tender.  Denies any fevers, chills.    OBJECTIVE:  Temp:  [37 °C (98.6 °F)-37.7 °C (99.8 °F)] 37.3 °C (99.1 °F)  Pulse:  [87-98] 98  Resp:  [16-18] 18  BP: ()/(65-73) 112/72  SpO2:  [93 %-94 %] 94 %    Intake/Output Summary (Last 24 hours) at 6/17/2024 0907  Last data filed at 6/16/2024 1904  Gross per 24 hour   Intake 100 ml   Output --   Net 100 ml       PE:  Gen: No acute distress, resting comfortably in bed  HEENT: normocephalic, atraumatic, EOMI  Pulm: clear to auscultation bilaterally, no respiratory distress   Cardio: RRR, no M/R/G  Abdom: soft, nontender, nondistended, normoactive bowel sounds in all quadrants  Ext: No edema, 2+ pulses bilaterally  Skin: Maculopapular rash with noted over the bilateral upper extremities, chest, and bilateral lower extremity, erythematous, no increased warmth, no tenderness to palpation or excoriations. Left lower extremity erythema and edema decreased within the previously drawn demarcations from admission,  "tenderness to palpation over area.   Neuro: Grossly intact    LABS:  Recent Labs     06/16/24 1800 06/17/24 0310 06/18/24  0231   WBC 16.3* 13.2* 10.2   RBC 4.65* 4.61* 4.60*   HEMOGLOBIN 14.1 14.0 14.1   HEMATOCRIT 40.7* 39.6* 39.7*   MCV 87.5 85.9 86.3   MCH 30.3 30.4 30.7   RDW 39.8 39.0 39.4   PLATELETCT 200 203 241   MPV 10.2 10.5 10.6   NEUTSPOLYS 83.30* 81.80* 80.30*   LYMPHOCYTES 6.50* 8.30* 8.50*   MONOCYTES 8.90 8.10 8.70   EOSINOPHILS 0.70 0.90 1.60   BASOPHILS 0.20 0.30 0.40     Recent Labs     06/16/24 1800 06/17/24 0310 06/18/24  0231   SODIUM 138 136 139   POTASSIUM 3.5* 3.5* 3.8   CHLORIDE 104 106 105   CO2 23 20 22   BUN 14 12 11   CREATININE 0.78 0.74 0.73   CALCIUM 9.0 8.6 8.9   ALBUMIN 3.6  --   --      Estimated GFR/CRCL = Estimated Creatinine Clearance: 148.8 mL/min (by C-G formula based on SCr of 0.73 mg/dL).  Recent Labs     06/16/24 1800 06/17/24 0310 06/18/24  0231   GLUCOSE 117* 105* 118*     Recent Labs     06/16/24 1800   ASTSGOT 17   ALTSGPT 13   TBILIRUBIN 0.9   ALKPHOSPHAT 72   GLOBULIN 3.2             No results for input(s): \"INR\", \"APTT\", \"FIBRINOGEN\" in the last 72 hours.    Invalid input(s): \"DIMER\"    MICROBIOLOGY:   No results found for: \"BLOODCULTU\", \"BLDCULT\", \"BCHOLD\"     IMAGING:   US-EXTREMITY NON VASCULAR UNILATERAL LEFT   Final Result      Complex fluid collection in the anterior left lower leg soft tissues measuring 8.6 x 3.8 x 1.6 cm in diameter. See the discussion above.      DX-TIBIA AND FIBULA LEFT   Final Result      1.  No acute osseous abnormality detected. Diffuse soft tissue swelling.          MEDS:  Current Facility-Administered Medications   Medication Last Admin    diphenhydrAMINE (Benadryl) injection 25 mg 25 mg at 06/18/24 1512    sulfamethoxazole-trimethoprim (Bactrim DS) 800-160 MG tablet 1 Tablet      enoxaparin (Lovenox) inj 40 mg 40 mg at 06/17/24 1633    acetaminophen (Tylenol) tablet 650 mg 650 mg at 06/18/24 0747    ibuprofen (Motrin) tablet " 400 mg 400 mg at 06/18/24 1106       PROBLEM LIST:  Problem Noted   Hypersensitivity Reaction 6/17/2024   Cellulitis 6/16/2024       ASSESSMENT/PLAN: 58 y.o. male with no significant past medical history was admitted on 6/16/2024 for left lower extremity cellulitis.    * Cellulitis- (present on admission)  Assessment & Plan  4-day history of slowly spreading erythema and edema from left tibial tuberosity after bumping area on table, associated with fevers, chills, nausea with leukocytosis on admission and elevated CRP.  Suspect at this time most likely left lower extremity cellulitis, low concern for DVT given no calf pain or discoloration and no recent immobilization, also low concern for compartment syndrome.  Left tibia/fibula x-ray showed no osseous abnormality, evidence of soft tissue swelling.  Leukocytosis improving with IV antibiotics.  MRSA nares negative.  Patient mildly tachycardic on admission however has had stable vital signs since then.  -Patient initiated on IV Ancef 2 g every 8 hours for 10-day total course on admission.  Switched to oral Cephalexin 6/17 given most likely hypersensitivity reaction (see details below).  Due to continued presence of maculopapular rash, switch to oral Bactrim 6/18 for 10-day total course.  - Blood cultures taken in ED show no growth to date.  Will continue to monitor.  - Soft tissue ultrasound ordered, showed 8.6 x 3.8 x 1.6 cm complicated fluid collection  - Due to concern for possible abscess and need for I&D, orthopedic surgery consulted.  Recommend monitoring for clinical improvement of area of fluid collection for 24 hours, if area does not have improvement then would recommend CT.  - Monitor daily CBC  - Monitor inpatient for symptomatic improvement and following blood cultures      Hypersensitivity reaction  Assessment & Plan  Patient with continued maculopapular rash noted over bilateral upper extremities, chest, left lower extremity after initiation of Ancef  for left lower extremity cellulitis.  Suspicion at this time is for hypersensitivity reaction to Ancef.  Discussed with pharmacy.  - Ordered Benadryl IV 25 mg every 6 hours as needed for rash  - Due to continued rash while on cephalexin, switch to oral Bactrim.      Core Measures:  Fluids: PO intake  Lines: PIV  Abx: Bactrim  Diet: Regular  PPX: SCDs/TEDs, Lovenox    #DISPOSITION  - Inpatient for symptomatic improvement of cellulitis and monitoring of blood cultures to r/o bacteremia    Kimmy Watson M.D.  PGY-1  [facility]

## 2024-06-18 NOTE — CARE PLAN
The patient is Stable - Low risk of patient condition declining or worsening    Shift Goals  Clinical Goals: monitor increasing rash and swelling, abx, surgery consult, safety  Patient Goals: rest and comfort, go home  Family Goals: prabhjot    Axo4. Able to make needs known.  VSS WNL. Pain managed by PRN medication. Due medications given as ordered. Afebrile. Hourly  rounds done. Bed alarm on. Call light and personal belongings within reach. Needs met at this time.    Progress made toward(s) clinical / shift goals:      Problem: Pain - Standard  Goal: Alleviation of pain or a reduction in pain to the patient’s comfort goal  Outcome: Progressing  Flowsheets (Taken 6/18/2024 0747)  Pain Rating Scale (NPRS): 6     Problem: Knowledge Deficit - Standard  Goal: Patient and family/care givers will demonstrate understanding of plan of care, disease process/condition, diagnostic tests and medications  Outcome: Progressing  Note: Pt agreeable to the changes on his plan of care.      Problem: Nutrition  Goal: Patient's nutritional and fluid intake will be adequate or improve  Outcome: Progressing  Flowsheets  Taken 6/18/2024 1300 by Meghana Zelaya, Student  P.O.: 240 mL  Oral Nutrition:   Breakfast   Between % Consumed  Taken 6/17/2024 2034 by Isi Thompson RULISSES  Oral Nutrition Supplement: Between % Consumed  Note: Pt appetite and PO intake is adequate     Problem: Mobility  Goal: Patient's capacity to carry out activities will improve  Outcome: Progressing  Flowsheets  Taken 6/18/2024 0745 by Michael Rivero  Level of Mobility: Level IV  Activity Performed: Sitting up in bed  Time Activity Tolerated: 5 min  Assistance: No Assistance Required  Taken 6/18/2024 0100 by Zenia Gallardo, C.N.A.  Distance Per Occurrence (ft.): 10 feet  # of Times Distance was Traveled: 2

## 2024-06-19 ENCOUNTER — APPOINTMENT (OUTPATIENT)
Dept: RADIOLOGY | Facility: MEDICAL CENTER | Age: 59
DRG: 501 | End: 2024-06-19
Payer: COMMERCIAL

## 2024-06-19 ENCOUNTER — ANESTHESIA EVENT (OUTPATIENT)
Dept: SURGERY | Facility: MEDICAL CENTER | Age: 59
DRG: 501 | End: 2024-06-19
Payer: COMMERCIAL

## 2024-06-19 LAB
ANION GAP SERPL CALC-SCNC: 13 MMOL/L (ref 7–16)
BUN SERPL-MCNC: 10 MG/DL (ref 8–22)
CALCIUM SERPL-MCNC: 8.8 MG/DL (ref 8.5–10.5)
CHLORIDE SERPL-SCNC: 104 MMOL/L (ref 96–112)
CO2 SERPL-SCNC: 21 MMOL/L (ref 20–33)
CREAT SERPL-MCNC: 0.73 MG/DL (ref 0.5–1.4)
ERYTHROCYTE [DISTWIDTH] IN BLOOD BY AUTOMATED COUNT: 39.6 FL (ref 35.9–50)
GFR SERPLBLD CREATININE-BSD FMLA CKD-EPI: 106 ML/MIN/1.73 M 2
GLUCOSE SERPL-MCNC: 98 MG/DL (ref 65–99)
HCT VFR BLD AUTO: 41.7 % (ref 42–52)
HGB BLD-MCNC: 14.7 G/DL (ref 14–18)
MCH RBC QN AUTO: 30.6 PG (ref 27–33)
MCHC RBC AUTO-ENTMCNC: 35.3 G/DL (ref 32.3–36.5)
MCV RBC AUTO: 86.7 FL (ref 81.4–97.8)
PLATELET # BLD AUTO: 261 K/UL (ref 164–446)
PMV BLD AUTO: 9.8 FL (ref 9–12.9)
POTASSIUM SERPL-SCNC: 3.8 MMOL/L (ref 3.6–5.5)
RBC # BLD AUTO: 4.81 M/UL (ref 4.7–6.1)
SODIUM SERPL-SCNC: 138 MMOL/L (ref 135–145)
WBC # BLD AUTO: 11 K/UL (ref 4.8–10.8)

## 2024-06-19 PROCEDURE — 700117 HCHG RX CONTRAST REV CODE 255

## 2024-06-19 PROCEDURE — 700102 HCHG RX REV CODE 250 W/ 637 OVERRIDE(OP): Performed by: BEHAVIOR ANALYST

## 2024-06-19 PROCEDURE — A9270 NON-COVERED ITEM OR SERVICE: HCPCS

## 2024-06-19 PROCEDURE — 99232 SBSQ HOSP IP/OBS MODERATE 35: CPT | Mod: GC | Performed by: FAMILY MEDICINE

## 2024-06-19 PROCEDURE — 700111 HCHG RX REV CODE 636 W/ 250 OVERRIDE (IP)

## 2024-06-19 PROCEDURE — 80048 BASIC METABOLIC PNL TOTAL CA: CPT

## 2024-06-19 PROCEDURE — A9270 NON-COVERED ITEM OR SERVICE: HCPCS | Performed by: BEHAVIOR ANALYST

## 2024-06-19 PROCEDURE — 85027 COMPLETE CBC AUTOMATED: CPT

## 2024-06-19 PROCEDURE — 73701 CT LOWER EXTREMITY W/DYE: CPT | Mod: LT

## 2024-06-19 PROCEDURE — 770006 HCHG ROOM/CARE - MED/SURG/GYN SEMI*

## 2024-06-19 PROCEDURE — 700102 HCHG RX REV CODE 250 W/ 637 OVERRIDE(OP)

## 2024-06-19 RX ADMIN — IBUPROFEN 400 MG: 400 TABLET, FILM COATED ORAL at 02:30

## 2024-06-19 RX ADMIN — DIPHENHYDRAMINE HYDROCHLORIDE 25 MG: 50 INJECTION, SOLUTION INTRAMUSCULAR; INTRAVENOUS at 07:09

## 2024-06-19 RX ADMIN — SULFAMETHOXAZOLE AND TRIMETHOPRIM 1 TABLET: 800; 160 TABLET ORAL at 17:25

## 2024-06-19 RX ADMIN — SULFAMETHOXAZOLE AND TRIMETHOPRIM 1 TABLET: 800; 160 TABLET ORAL at 04:43

## 2024-06-19 RX ADMIN — DIPHENHYDRAMINE HYDROCHLORIDE 25 MG: 50 INJECTION, SOLUTION INTRAMUSCULAR; INTRAVENOUS at 14:34

## 2024-06-19 RX ADMIN — ACETAMINOPHEN 650 MG: 325 TABLET, FILM COATED ORAL at 00:24

## 2024-06-19 RX ADMIN — IOHEXOL 100 ML: 350 INJECTION, SOLUTION INTRAVENOUS at 12:41

## 2024-06-19 RX ADMIN — DIPHENHYDRAMINE HYDROCHLORIDE 25 MG: 50 INJECTION, SOLUTION INTRAMUSCULAR; INTRAVENOUS at 00:12

## 2024-06-19 RX ADMIN — ACETAMINOPHEN 650 MG: 325 TABLET, FILM COATED ORAL at 12:57

## 2024-06-19 RX ADMIN — ACETAMINOPHEN 650 MG: 325 TABLET, FILM COATED ORAL at 22:35

## 2024-06-19 RX ADMIN — DIPHENHYDRAMINE HYDROCHLORIDE 25 MG: 50 INJECTION, SOLUTION INTRAMUSCULAR; INTRAVENOUS at 22:35

## 2024-06-19 ASSESSMENT — PAIN DESCRIPTION - PAIN TYPE
TYPE: ACUTE PAIN

## 2024-06-19 NOTE — PROGRESS NOTES
[facility] [place] [facility] MEDICINE PROGRESS NOTE     Attending: Josselyn Boss M.d.  Senior Resident: Annie Lockwood M.D. (PGY-2)  Jose Resident: Kimmy Watson M.D. (PGY-1)  PATIENT: Alton Koehler; [id number]; 1965    Hospital Day # Hospital Day: 4    ID: 58 y.o. male with no significant past medical history was admitted on 6/16/2024   for left lower extremity cellulitis.    24 Hour Events: No acute events overnight    Subjective: This morning, patient continues to have the same rash over his bilateral upper and lower extremities. During the day yesterday the rash was controlled with IV benadryl, increased overnight. Continues to deny chest pain, shortness of breath, mouth or throat swelling, abdominal pain. Does report improvement in left lower extremity pain, was able to ambulate with full weight bearing during the day yesterday.     OBJECTIVE:  Temp:  [36.1 °C (96.9 °F)-37.3 °C (99.1 °F)] 36.1 °C (96.9 °F)  Pulse:  [80-98] 80  Resp:  [16-18] 17  BP: (103-112)/(64-72) 104/64  SpO2:  [93 %-94 %] 94 %    Intake/Output Summary (Last 24 hours) at 6/17/2024 0907  Last data filed at 6/16/2024 1904  Gross per 24 hour   Intake 100 ml   Output --   Net 100 ml       PE:  Gen: No acute distress, resting comfortably in bed  HEENT: normocephalic, atraumatic, EOMI  Pulm: clear to auscultation bilaterally, no respiratory distress   Cardio: RRR, no M/R/G  Abdom: soft, nontender, nondistended, normoactive bowel sounds in all quadrants  Ext: No edema, 2+ pulses bilaterally  Skin: Maculopapular rash with noted over the bilateral upper extremities, chest, and bilateral lower extremity, erythematous, no increased warmth, no tenderness to palpation or excoriations. Left lower extremity erythema and edema decreased within the previously drawn demarcations from admission, tenderness to palpation over area. Area of fluctuance and increased swelling/edema over the tibial tuberosity noted.  Neuro: Grossly intact    LABS:  Recent Labs      "06/16/24  1800 06/17/24 0310 06/18/24 0231 06/19/24  0634   WBC 16.3* 13.2* 10.2 11.0*   RBC 4.65* 4.61* 4.60* 4.81   HEMOGLOBIN 14.1 14.0 14.1 14.7   HEMATOCRIT 40.7* 39.6* 39.7* 41.7*   MCV 87.5 85.9 86.3 86.7   MCH 30.3 30.4 30.7 30.6   RDW 39.8 39.0 39.4 39.6   PLATELETCT 200 203 241 261   MPV 10.2 10.5 10.6 9.8   NEUTSPOLYS 83.30* 81.80* 80.30*  --    LYMPHOCYTES 6.50* 8.30* 8.50*  --    MONOCYTES 8.90 8.10 8.70  --    EOSINOPHILS 0.70 0.90 1.60  --    BASOPHILS 0.20 0.30 0.40  --      Recent Labs     06/16/24  1800 06/17/24 0310 06/18/24 0231 06/19/24  0634   SODIUM 138 136 139 138   POTASSIUM 3.5* 3.5* 3.8 3.8   CHLORIDE 104 106 105 104   CO2 23 20 22 21   BUN 14 12 11 10   CREATININE 0.78 0.74 0.73 0.73   CALCIUM 9.0 8.6 8.9 8.8   ALBUMIN 3.6  --   --   --      Estimated GFR/CRCL = Estimated Creatinine Clearance: 148.8 mL/min (by C-G formula based on SCr of 0.73 mg/dL).  Recent Labs     06/17/24 0310 06/18/24 0231 06/19/24  0634   GLUCOSE 105* 118* 98     Recent Labs     06/16/24  1800   ASTSGOT 17   ALTSGPT 13   TBILIRUBIN 0.9   ALKPHOSPHAT 72   GLOBULIN 3.2             No results for input(s): \"INR\", \"APTT\", \"FIBRINOGEN\" in the last 72 hours.    Invalid input(s): \"DIMER\"    MICROBIOLOGY:   No results found for: \"BLOODCULTU\", \"BLDCULT\", \"BCHOLD\"     IMAGING:   US-EXTREMITY NON VASCULAR UNILATERAL LEFT   Final Result      Complex fluid collection in the anterior left lower leg soft tissues measuring 8.6 x 3.8 x 1.6 cm in diameter. See the discussion above.      DX-TIBIA AND FIBULA LEFT   Final Result      1.  No acute osseous abnormality detected. Diffuse soft tissue swelling.      CT-EXTREMITY, LOWER WITH LEFT    (Results Pending)       MEDS:  Current Facility-Administered Medications   Medication Last Admin    diphenhydrAMINE (Benadryl) injection 25 mg 25 mg at 06/19/24 0709    sulfamethoxazole-trimethoprim (Bactrim DS) 800-160 MG tablet 1 Tablet 1 Tablet at 06/19/24 4633    [Held by provider] " enoxaparin (Lovenox) inj 40 mg 40 mg at 06/17/24 1633    acetaminophen (Tylenol) tablet 650 mg 650 mg at 06/19/24 0024    ibuprofen (Motrin) tablet 400 mg 400 mg at 06/19/24 0230       PROBLEM LIST:  Problem Noted   Hypersensitivity Reaction 6/17/2024   Cellulitis 6/16/2024       ASSESSMENT/PLAN: 58 y.o. male with no significant past medical history was admitted on 6/16/2024 for left lower extremity cellulitis.    * Cellulitis- (present on admission)  Assessment & Plan  4-day history of slowly spreading erythema and edema from left tibial tuberosity after bumping area on table, associated with fevers, chills, nausea with leukocytosis on admission and elevated CRP.  Suspect at this time most likely left lower extremity cellulitis, low concern for DVT given no calf pain or discoloration and no recent immobilization, also low concern for compartment syndrome.  Left tibia/fibula x-ray showed no osseous abnormality, evidence of soft tissue swelling.  Leukocytosis improving with antibiotics.  MRSA nares negative.  Patient mildly tachycardic on admission however has had stable vital signs since then.  -Patient initiated on IV Ancef 2 g every 8 hours for 10-day total course on admission.  Switched to oral Cephalexin 6/17 given most likely hypersensitivity reaction.  Due to continued presence of maculopapular rash, switch to oral Bactrim 6/18 for 10-day total course.  - Blood cultures taken in ED show no growth to date.  Will continue to monitor.  - Soft tissue ultrasound ordered, showed 8.6 x 3.8 x 1.6 cm complicated fluid collection  - Due to concern for possible abscess and need for I&D, orthopedic surgery consulted.  No clinical improvement this morning over area of fluid collection, CT soft tissue ordered. Will reach out to orthopedic surgery again pending results.   - Monitor daily CBC  - Monitor inpatient for symptomatic improvement and following blood cultures      Hypersensitivity reaction  Assessment &  Plan  Patient with continued maculopapular rash noted over bilateral upper extremities, chest, left lower extremity after initiation of Ancef for left lower extremity cellulitis.  Suspicion at this time is for hypersensitivity reaction to Ancef.  Discussed with pharmacy.  - Continue Benadryl IV 25 mg every 6 hours as needed for rash  - Due to continued rash while on cephalexin, switched to oral Bactrim. Will continue.      Core Measures:  Fluids: PO intake  Lines: PIV  Abx: Bactrim  Diet: Regular  PPX: SCDs/TEDs, Lovenox    #DISPOSITION  - Inpatient for possible I&D of fluid collection    Kimmy Watson M.D.  PGY-1  [facility]

## 2024-06-19 NOTE — CARE PLAN
The patient is Stable - Low risk of patient condition declining or worsening    Shift Goals  Clinical Goals: monitor left leg, CT, safety  Patient Goals: go home  Family Goals: prabhjot    Axo4. Able to make needs known. VSS WNL. Denies pain. Afebrile. Due medications given as ordered. Remains free from injury or falls. Hourly rounds done. Bed alarm refused. Seen with Left lower leg redness and warm to touch. Due medications given as ordered. Call light and personal belongings within reach. Needs met at this time.    Progress made toward(s) clinical / shift goals:      Problem: Knowledge Deficit - Standard  Goal: Patient and family/care givers will demonstrate understanding of plan of care, disease process/condition, diagnostic tests and medications  Outcome: Progressing     Problem: Nutrition  Goal: Patient's nutritional and fluid intake will be adequate or improve  Outcome: Progressing     Problem: Infection - Standard  Goal: Patient will remain free from infection  Outcome: Progressing     Problem: Mobility  Goal: Patient's capacity to carry out activities will improve  Outcome: Met  Flowsheets (Taken 6/19/2024 1000)  Level of Mobility: Level IV  Activity Performed:   Ambulate   Stand   Edge of bed   Up to bathroom   Back to bed  Note: Ambulates with no assistance required. No assistive device.        Patient is not progressing towards the following goals:

## 2024-06-19 NOTE — PROGRESS NOTES
Received report from day shift nurse. Patient is alert and oriented x4. Bed is locked and in the lowest position, personal belongings within reach.

## 2024-06-19 NOTE — CARE PLAN
The patient is Stable - Low risk of patient condition declining or worsening    Shift Goals  Clinical Goals: will monitor leg swelling/redness for any signs of swelling getting worst  Patient Goals: rest/ comfort  Family Goals: SERENA    Progress made toward(s) clinical / shift goals:  area monitored throughout the shift. Per patient he notice that swelling has gone down in comparison to a couple of days ago. Patient NPO at midnight    Patient is not progressing towards the following goals:

## 2024-06-20 ENCOUNTER — ANESTHESIA (OUTPATIENT)
Dept: SURGERY | Facility: MEDICAL CENTER | Age: 59
DRG: 501 | End: 2024-06-20
Payer: COMMERCIAL

## 2024-06-20 ENCOUNTER — PHARMACY VISIT (OUTPATIENT)
Dept: PHARMACY | Facility: MEDICAL CENTER | Age: 59
End: 2024-06-20
Payer: COMMERCIAL

## 2024-06-20 VITALS
TEMPERATURE: 97.6 F | HEART RATE: 100 BPM | DIASTOLIC BLOOD PRESSURE: 68 MMHG | RESPIRATION RATE: 18 BRPM | SYSTOLIC BLOOD PRESSURE: 101 MMHG | OXYGEN SATURATION: 94 % | HEIGHT: 72 IN | BODY MASS INDEX: 35.53 KG/M2 | WEIGHT: 262.35 LBS

## 2024-06-20 LAB
FUNGUS SPEC FUNGUS STN: NORMAL
GRAM STN SPEC: ABNORMAL
SIGNIFICANT IND 70042: ABNORMAL
SIGNIFICANT IND 70042: NORMAL
SITE SITE: ABNORMAL
SITE SITE: NORMAL
SOURCE SOURCE: ABNORMAL
SOURCE SOURCE: NORMAL

## 2024-06-20 PROCEDURE — 160009 HCHG ANES TIME/MIN: Performed by: ORTHOPAEDIC SURGERY

## 2024-06-20 PROCEDURE — 700102 HCHG RX REV CODE 250 W/ 637 OVERRIDE(OP): Performed by: STUDENT IN AN ORGANIZED HEALTH CARE EDUCATION/TRAINING PROGRAM

## 2024-06-20 PROCEDURE — A9270 NON-COVERED ITEM OR SERVICE: HCPCS | Performed by: BEHAVIOR ANALYST

## 2024-06-20 PROCEDURE — 160038 HCHG SURGERY MINUTES - EA ADDL 1 MIN LEVEL 2: Performed by: ORTHOPAEDIC SURGERY

## 2024-06-20 PROCEDURE — 700105 HCHG RX REV CODE 258: Performed by: STUDENT IN AN ORGANIZED HEALTH CARE EDUCATION/TRAINING PROGRAM

## 2024-06-20 PROCEDURE — A9270 NON-COVERED ITEM OR SERVICE: HCPCS | Performed by: STUDENT IN AN ORGANIZED HEALTH CARE EDUCATION/TRAINING PROGRAM

## 2024-06-20 PROCEDURE — 700111 HCHG RX REV CODE 636 W/ 250 OVERRIDE (IP)

## 2024-06-20 PROCEDURE — 160027 HCHG SURGERY MINUTES - 1ST 30 MINS LEVEL 2: Performed by: ORTHOPAEDIC SURGERY

## 2024-06-20 PROCEDURE — 700111 HCHG RX REV CODE 636 W/ 250 OVERRIDE (IP): Performed by: STUDENT IN AN ORGANIZED HEALTH CARE EDUCATION/TRAINING PROGRAM

## 2024-06-20 PROCEDURE — 700102 HCHG RX REV CODE 250 W/ 637 OVERRIDE(OP)

## 2024-06-20 PROCEDURE — 87070 CULTURE OTHR SPECIMN AEROBIC: CPT

## 2024-06-20 PROCEDURE — 0M9P0ZX DRAINAGE OF LEFT KNEE BURSA AND LIGAMENT, OPEN APPROACH, DIAGNOSTIC: ICD-10-PCS | Performed by: STUDENT IN AN ORGANIZED HEALTH CARE EDUCATION/TRAINING PROGRAM

## 2024-06-20 PROCEDURE — RXMED WILLOW AMBULATORY MEDICATION CHARGE

## 2024-06-20 PROCEDURE — A9270 NON-COVERED ITEM OR SERVICE: HCPCS

## 2024-06-20 PROCEDURE — 160035 HCHG PACU - 1ST 60 MINS PHASE I: Performed by: ORTHOPAEDIC SURGERY

## 2024-06-20 PROCEDURE — 700101 HCHG RX REV CODE 250: Performed by: STUDENT IN AN ORGANIZED HEALTH CARE EDUCATION/TRAINING PROGRAM

## 2024-06-20 PROCEDURE — 0MBP0ZZ EXCISION OF LEFT KNEE BURSA AND LIGAMENT, OPEN APPROACH: ICD-10-PCS | Performed by: STUDENT IN AN ORGANIZED HEALTH CARE EDUCATION/TRAINING PROGRAM

## 2024-06-20 PROCEDURE — 99238 HOSP IP/OBS DSCHRG MGMT 30/<: CPT | Mod: GC | Performed by: FAMILY MEDICINE

## 2024-06-20 PROCEDURE — 160002 HCHG RECOVERY MINUTES (STAT): Performed by: ORTHOPAEDIC SURGERY

## 2024-06-20 PROCEDURE — 87075 CULTR BACTERIA EXCEPT BLOOD: CPT

## 2024-06-20 PROCEDURE — 99222 1ST HOSP IP/OBS MODERATE 55: CPT | Mod: 57 | Performed by: ORTHOPAEDIC SURGERY

## 2024-06-20 PROCEDURE — 87186 SC STD MICRODIL/AGAR DIL: CPT

## 2024-06-20 PROCEDURE — 87077 CULTURE AEROBIC IDENTIFY: CPT

## 2024-06-20 PROCEDURE — 700111 HCHG RX REV CODE 636 W/ 250 OVERRIDE (IP): Mod: JZ | Performed by: STUDENT IN AN ORGANIZED HEALTH CARE EDUCATION/TRAINING PROGRAM

## 2024-06-20 PROCEDURE — 160048 HCHG OR STATISTICAL LEVEL 1-5: Performed by: ORTHOPAEDIC SURGERY

## 2024-06-20 PROCEDURE — 87102 FUNGUS ISOLATION CULTURE: CPT

## 2024-06-20 PROCEDURE — 87205 SMEAR GRAM STAIN: CPT

## 2024-06-20 PROCEDURE — 27340 REMOVAL OF KNEECAP BURSA: CPT | Mod: LT | Performed by: STUDENT IN AN ORGANIZED HEALTH CARE EDUCATION/TRAINING PROGRAM

## 2024-06-20 PROCEDURE — 700102 HCHG RX REV CODE 250 W/ 637 OVERRIDE(OP): Performed by: BEHAVIOR ANALYST

## 2024-06-20 RX ORDER — OXYCODONE HCL 5 MG/5 ML
5 SOLUTION, ORAL ORAL
Status: COMPLETED | OUTPATIENT
Start: 2024-06-20 | End: 2024-06-20

## 2024-06-20 RX ORDER — MIDAZOLAM HYDROCHLORIDE 1 MG/ML
INJECTION INTRAMUSCULAR; INTRAVENOUS PRN
Status: DISCONTINUED | OUTPATIENT
Start: 2024-06-20 | End: 2024-06-20 | Stop reason: SURG

## 2024-06-20 RX ORDER — MEPERIDINE HYDROCHLORIDE 50 MG/ML
6.25 INJECTION INTRAMUSCULAR; INTRAVENOUS; SUBCUTANEOUS
Status: DISCONTINUED | OUTPATIENT
Start: 2024-06-20 | End: 2024-06-20 | Stop reason: HOSPADM

## 2024-06-20 RX ORDER — ONDANSETRON 2 MG/ML
INJECTION INTRAMUSCULAR; INTRAVENOUS PRN
Status: DISCONTINUED | OUTPATIENT
Start: 2024-06-20 | End: 2024-06-20 | Stop reason: SURG

## 2024-06-20 RX ORDER — ACETAMINOPHEN 500 MG
1000 TABLET ORAL ONCE
Status: DISCONTINUED | OUTPATIENT
Start: 2024-06-20 | End: 2024-06-20 | Stop reason: HOSPADM

## 2024-06-20 RX ORDER — LABETALOL HYDROCHLORIDE 5 MG/ML
5 INJECTION, SOLUTION INTRAVENOUS
Status: DISCONTINUED | OUTPATIENT
Start: 2024-06-20 | End: 2024-06-20 | Stop reason: HOSPADM

## 2024-06-20 RX ORDER — ONDANSETRON 2 MG/ML
4 INJECTION INTRAMUSCULAR; INTRAVENOUS
Status: DISCONTINUED | OUTPATIENT
Start: 2024-06-20 | End: 2024-06-20 | Stop reason: HOSPADM

## 2024-06-20 RX ORDER — ACETAMINOPHEN 500 MG
1000 TABLET ORAL ONCE
Status: DISCONTINUED | OUTPATIENT
Start: 2024-06-20 | End: 2024-06-20

## 2024-06-20 RX ORDER — EPHEDRINE SULFATE 50 MG/ML
5 INJECTION, SOLUTION INTRAVENOUS
Status: DISCONTINUED | OUTPATIENT
Start: 2024-06-20 | End: 2024-06-20 | Stop reason: HOSPADM

## 2024-06-20 RX ORDER — HYDROMORPHONE HYDROCHLORIDE 1 MG/ML
0.1 INJECTION, SOLUTION INTRAMUSCULAR; INTRAVENOUS; SUBCUTANEOUS
Status: DISCONTINUED | OUTPATIENT
Start: 2024-06-20 | End: 2024-06-20 | Stop reason: HOSPADM

## 2024-06-20 RX ORDER — SODIUM CHLORIDE, SODIUM LACTATE, POTASSIUM CHLORIDE, CALCIUM CHLORIDE 600; 310; 30; 20 MG/100ML; MG/100ML; MG/100ML; MG/100ML
INJECTION, SOLUTION INTRAVENOUS
Status: DISCONTINUED | OUTPATIENT
Start: 2024-06-20 | End: 2024-06-20 | Stop reason: SURG

## 2024-06-20 RX ORDER — SODIUM CHLORIDE, SODIUM LACTATE, POTASSIUM CHLORIDE, CALCIUM CHLORIDE 600; 310; 30; 20 MG/100ML; MG/100ML; MG/100ML; MG/100ML
INJECTION, SOLUTION INTRAVENOUS CONTINUOUS
Status: DISCONTINUED | OUTPATIENT
Start: 2024-06-20 | End: 2024-06-20 | Stop reason: HOSPADM

## 2024-06-20 RX ORDER — KETOROLAC TROMETHAMINE 15 MG/ML
INJECTION, SOLUTION INTRAMUSCULAR; INTRAVENOUS PRN
Status: DISCONTINUED | OUTPATIENT
Start: 2024-06-20 | End: 2024-06-20 | Stop reason: SURG

## 2024-06-20 RX ORDER — VANCOMYCIN HYDROCHLORIDE 1 G/20ML
INJECTION, POWDER, LYOPHILIZED, FOR SOLUTION INTRAVENOUS PRN
Status: DISCONTINUED | OUTPATIENT
Start: 2024-06-20 | End: 2024-06-20 | Stop reason: SURG

## 2024-06-20 RX ORDER — ASPIRIN 325 MG
325 TABLET ORAL 2 TIMES DAILY
Qty: 60 TABLET | Refills: 1 | Status: SHIPPED | OUTPATIENT
Start: 2024-06-20

## 2024-06-20 RX ORDER — OXYCODONE HCL 5 MG/5 ML
10 SOLUTION, ORAL ORAL
Status: COMPLETED | OUTPATIENT
Start: 2024-06-20 | End: 2024-06-20

## 2024-06-20 RX ORDER — ACETAMINOPHEN 325 MG/1
650 TABLET ORAL EVERY 6 HOURS PRN
COMMUNITY
Start: 2024-06-20

## 2024-06-20 RX ORDER — HYDROMORPHONE HYDROCHLORIDE 1 MG/ML
0.4 INJECTION, SOLUTION INTRAMUSCULAR; INTRAVENOUS; SUBCUTANEOUS
Status: DISCONTINUED | OUTPATIENT
Start: 2024-06-20 | End: 2024-06-20 | Stop reason: HOSPADM

## 2024-06-20 RX ORDER — HYDRALAZINE HYDROCHLORIDE 20 MG/ML
5 INJECTION INTRAMUSCULAR; INTRAVENOUS
Status: DISCONTINUED | OUTPATIENT
Start: 2024-06-20 | End: 2024-06-20 | Stop reason: HOSPADM

## 2024-06-20 RX ORDER — DEXAMETHASONE SODIUM PHOSPHATE 4 MG/ML
INJECTION, SOLUTION INTRA-ARTICULAR; INTRALESIONAL; INTRAMUSCULAR; INTRAVENOUS; SOFT TISSUE PRN
Status: DISCONTINUED | OUTPATIENT
Start: 2024-06-20 | End: 2024-06-20 | Stop reason: SURG

## 2024-06-20 RX ORDER — HALOPERIDOL 5 MG/ML
1 INJECTION INTRAMUSCULAR
Status: DISCONTINUED | OUTPATIENT
Start: 2024-06-20 | End: 2024-06-20 | Stop reason: HOSPADM

## 2024-06-20 RX ORDER — LIDOCAINE HYDROCHLORIDE 20 MG/ML
INJECTION, SOLUTION EPIDURAL; INFILTRATION; INTRACAUDAL; PERINEURAL PRN
Status: DISCONTINUED | OUTPATIENT
Start: 2024-06-20 | End: 2024-06-20 | Stop reason: SURG

## 2024-06-20 RX ORDER — DIPHENHYDRAMINE HYDROCHLORIDE 50 MG/ML
12.5 INJECTION INTRAMUSCULAR; INTRAVENOUS
Status: DISCONTINUED | OUTPATIENT
Start: 2024-06-20 | End: 2024-06-20 | Stop reason: HOSPADM

## 2024-06-20 RX ORDER — DOXYCYCLINE 100 MG/1
100 CAPSULE ORAL 2 TIMES DAILY
Qty: 28 CAPSULE | Refills: 0 | Status: ACTIVE | OUTPATIENT
Start: 2024-06-20 | End: 2024-07-04

## 2024-06-20 RX ORDER — HYDROMORPHONE HYDROCHLORIDE 1 MG/ML
0.2 INJECTION, SOLUTION INTRAMUSCULAR; INTRAVENOUS; SUBCUTANEOUS
Status: DISCONTINUED | OUTPATIENT
Start: 2024-06-20 | End: 2024-06-20 | Stop reason: HOSPADM

## 2024-06-20 RX ORDER — METOPROLOL TARTRATE 1 MG/ML
1 INJECTION, SOLUTION INTRAVENOUS
Status: DISCONTINUED | OUTPATIENT
Start: 2024-06-20 | End: 2024-06-20 | Stop reason: HOSPADM

## 2024-06-20 RX ADMIN — ONDANSETRON 4 MG: 2 INJECTION INTRAMUSCULAR; INTRAVENOUS at 09:42

## 2024-06-20 RX ADMIN — DIPHENHYDRAMINE HYDROCHLORIDE 12.5 MG: 50 INJECTION, SOLUTION INTRAMUSCULAR; INTRAVENOUS at 10:30

## 2024-06-20 RX ADMIN — DIPHENHYDRAMINE HYDROCHLORIDE 25 MG: 50 INJECTION, SOLUTION INTRAMUSCULAR; INTRAVENOUS at 04:29

## 2024-06-20 RX ADMIN — HYDROMORPHONE HYDROCHLORIDE 0.4 MG: 1 INJECTION, SOLUTION INTRAMUSCULAR; INTRAVENOUS; SUBCUTANEOUS at 10:07

## 2024-06-20 RX ADMIN — SODIUM CHLORIDE, POTASSIUM CHLORIDE, SODIUM LACTATE AND CALCIUM CHLORIDE: 600; 310; 30; 20 INJECTION, SOLUTION INTRAVENOUS at 09:13

## 2024-06-20 RX ADMIN — SULFAMETHOXAZOLE AND TRIMETHOPRIM 1 TABLET: 800; 160 TABLET ORAL at 04:29

## 2024-06-20 RX ADMIN — DEXAMETHASONE SODIUM PHOSPHATE 8 MG: 4 INJECTION INTRA-ARTICULAR; INTRALESIONAL; INTRAMUSCULAR; INTRAVENOUS; SOFT TISSUE at 09:22

## 2024-06-20 RX ADMIN — OXYCODONE HYDROCHLORIDE 10 MG: 5 SOLUTION ORAL at 09:58

## 2024-06-20 RX ADMIN — PROPOFOL 200 MG: 10 INJECTION, EMULSION INTRAVENOUS at 09:19

## 2024-06-20 RX ADMIN — VANCOMYCIN HYDROCHLORIDE 2 G: 1 INJECTION, POWDER, LYOPHILIZED, FOR SOLUTION INTRAVENOUS at 09:19

## 2024-06-20 RX ADMIN — FENTANYL CITRATE 50 MCG: 50 INJECTION, SOLUTION INTRAMUSCULAR; INTRAVENOUS at 09:38

## 2024-06-20 RX ADMIN — ACETAMINOPHEN 650 MG: 325 TABLET, FILM COATED ORAL at 04:29

## 2024-06-20 RX ADMIN — HYDROMORPHONE HYDROCHLORIDE 0.2 MG: 1 INJECTION, SOLUTION INTRAMUSCULAR; INTRAVENOUS; SUBCUTANEOUS at 10:14

## 2024-06-20 RX ADMIN — FENTANYL CITRATE 50 MCG: 50 INJECTION, SOLUTION INTRAMUSCULAR; INTRAVENOUS at 09:16

## 2024-06-20 RX ADMIN — FENTANYL CITRATE 50 MCG: 50 INJECTION, SOLUTION INTRAMUSCULAR; INTRAVENOUS at 09:25

## 2024-06-20 RX ADMIN — HYDROMORPHONE HYDROCHLORIDE 0.4 MG: 1 INJECTION, SOLUTION INTRAMUSCULAR; INTRAVENOUS; SUBCUTANEOUS at 09:58

## 2024-06-20 RX ADMIN — KETOROLAC TROMETHAMINE 15 MG: 15 INJECTION, SOLUTION INTRAMUSCULAR; INTRAVENOUS at 09:42

## 2024-06-20 RX ADMIN — MIDAZOLAM HYDROCHLORIDE 2 MG: 2 INJECTION, SOLUTION INTRAMUSCULAR; INTRAVENOUS at 09:16

## 2024-06-20 RX ADMIN — LIDOCAINE HYDROCHLORIDE 100 MG: 20 INJECTION, SOLUTION EPIDURAL; INFILTRATION; INTRACAUDAL at 09:17

## 2024-06-20 ASSESSMENT — PAIN DESCRIPTION - PAIN TYPE
TYPE: SURGICAL PAIN
TYPE: ACUTE PAIN
TYPE: SURGICAL PAIN
TYPE: ACUTE PAIN

## 2024-06-20 NOTE — CARE PLAN
The patient is Stable - Low risk of patient condition declining or worsening    Shift Goals  Clinical Goals: I&D, Abx, safety  Patient Goals: discharge  Family Goals: prabhjot    Progress made toward(s) clinical / shift goals:      Problem: Pain - Standard  Goal: Alleviation of pain or a reduction in pain to the patient’s comfort goal  Outcome: Met     Problem: Knowledge Deficit - Standard  Goal: Patient and family/care givers will demonstrate understanding of plan of care, disease process/condition, diagnostic tests and medications  Outcome: Met     Problem: Nutrition  Goal: Patient's nutritional and fluid intake will be adequate or improve  Outcome: Met     Problem: Infection - Standard  Goal: Patient will remain free from infection  Outcome: Met

## 2024-06-20 NOTE — PROGRESS NOTES
Pt discharged. IV removed, discharge instructions provided to patient, pt verbalizes understanding. Pt states all questions have been answered. Copy of discharge paperwork provided to pt, signed copy in chart. Pt states all belongings in possession. Pt left unit via walking

## 2024-06-20 NOTE — OR NURSING
0913-Pt arrived from OR, resting calmly with even, unlabored breathing, vss, no pain, no nausea, site CDI and soft to palpation, CMS intact.    1021-RN made attempt to contact pt's significant other and provided updates, no answer.    1020-RN called report to PK Calhoun    1034-Pt transferred in bed with transport tech to floor.  Vss, no pain, no nausea, site CDI and soft to palpation, CMS intact.

## 2024-06-20 NOTE — ANESTHESIA TIME REPORT
Anesthesia Start and Stop Event Times       Date Time Event    6/20/2024 0900 Ready for Procedure     0913 Anesthesia Start     0952 Anesthesia Stop          Responsible Staff  06/20/24      Name Role Begin End    Yordan Rivera D.O. Anesth 0913 0952          Overtime Reason:  no overtime (within assigned shift)    Comments:

## 2024-06-20 NOTE — ANESTHESIA POSTPROCEDURE EVALUATION
Patient: Alton Koehler    Procedure Summary       Date: 06/20/24 Room / Location: [place] [address] / SURGERY [facility]    Anesthesia Start: 0913 Anesthesia Stop: 0952    Procedure: INCISION AND DRAINAGE KNEE (Left: Knee) Diagnosis: (Left septic prepatellar bursitis)    Surgeons: Sergio Enamorado M.D. Responsible Provider: Yordan Rivera D.O.    Anesthesia Type: general ASA Status: 2            Final Anesthesia Type: general  Last vitals  BP   Blood Pressure: 118/67    Temp   37.1 °C (98.8 °F)    Pulse   100   Resp   17    SpO2   92 %      Anesthesia Post Evaluation    Patient location during evaluation: PACU  Patient participation: complete - patient participated  Level of consciousness: awake and alert    Airway patency: patent  Anesthetic complications: no  Cardiovascular status: hemodynamically stable  Respiratory status: acceptable  Hydration status: euvolemic    PONV: none          No notable events documented.     Nurse Pain Score: 3 (NPRS)

## 2024-06-20 NOTE — OP REPORT
Operative report    DATE OF OPERATION: 6/20/2024     PREOPERATIVE DIAGNOSIS: Left knee septic prepatellar bursitis    POSTOPERATIVE DIAGNOSIS: Same    PROCEDURE PERFORMED:  1.  Left knee prepatellar bursectomy                                                    SURGEON: Wagner Hirsch M.D.     ASSISTANT: None    ANESTHESIA: General    ESTIMATED BLOOD LOSS: 10 mL    INDICATIONS: The patient is a 58 y.o. male with a left knee septic prepatellar bursitis.  He was admitted roughly 4 days ago and is undergone medical treatment including prolonged antibiotic.  Without resolution of symptoms.  CT scan showed prepatellar and anterior knee abscess formation with surrounding cellulitis.  I discussed the risks and benefits of the procedure, including the risks of recurrent infection, wound healing complication, neurovascular injury, need for repeat irrigation and debridement, and the medical risks of anesthesia including DVT, PE, MI, stroke, and death.  Benefits include eradication of infection and resolution of sepsis.  Alternatives to surgery were also discussed, including non-operative management.  The patient signed the informed consent and the operative extremity was marked.      PROCEDURE:  The patient underwent anesthesia, and was positioned supine on the operating room table and all bony prominences were well padded.  Preoperative antibiotics were held until cultures could be obtained. Sequential compression devices were employed. The correct operative site was prepped and draped into a sterile field. A procedural pause was conducted to verify correct patient, correct extremity, presence of the surgeons initials on the operative extremity.    An 8 cm incision was made over the anterior aspect of the knee spanning from just distal to the inferior pole patella over the tibial tubercle.  Purulence was immediately encountered.  Purulent fluid and deep tissue was sent for culture analysis.  Sharp dissection using knife  and electrocautery was used anterior the prepatellar septic bursitis cavity.  Continue excisional debridement with knife and rongeur was used to remove infected and nonviable tissue.  The abscess cavity was then irrigated with copious amounts of normal saline.  Appropriate hemostasis was achieved.  Incision was then closed in layered fashion with 2-0 PDS and 2-0 nylon.  Sterile dressings were then applied.    The patient tolerated the procedure well. There were no apparent complications. All sponge, needle, and instrument counts were correct on two separate occasions. He was awakened, extubated, and transferred to the recovery room in satisfactory condition.       Post-Operative Plan:    1.  The patient should remain full weightbearing on their operative extremity.  Gait aids (crutch or crutches, cane, walker) may be used as needed, and may be discontinued when no longer required.  2.  IV antibiotics - will be given postoperatively and adjusted by the Infectious Disease service as dictated by culture results.  3.  DVT prophylaxis - SCD's and Lovenox 40 mg SQ daily while inpatient.  The patient may transition to Aspirin 325 mg PO BID as an outpatient  4.  Discharge planning, follow-up with JAMSHID in 2 weeks as an outpatient for suture removal and wound check  ____________________________________   Wagner Hirsch M.D.   DD: 6/20/2024  9:48 AM

## 2024-06-20 NOTE — PROGRESS NOTES
Received report on day shift nurse. Patient is alert and oriented x4. Patient denies any pain at this time. Patient states he understands NPO status at midnight. Bed is locked and in the lowest position. Personal belongings within reach.     Rash still noted around pt's entire body, PRN benadryl given.  Patient complaining of 5/10 pain, PRN Tylenol given.     Pre op called for report, stating procedure is scheduled for 0930. Patient informed about procedure time.

## 2024-06-20 NOTE — CARE PLAN
The patient is Stable - Low risk of patient condition declining or worsening    Shift Goals  Clinical Goals: pt pain will remain controlled during the shift, NPO at midnight  Patient Goals: go home  Family Goals: SERENA    Progress made toward(s) clinical / shift goals:  Pt complains of pain during the shift. PRN medication given. Pt states understanding on NPO status.     Patient is not progressing towards the following goals:

## 2024-06-20 NOTE — ANESTHESIA PROCEDURE NOTES
Airway    Date/Time: 6/20/2024 9:20 AM    Performed by: Yordan Rivera D.O.  Authorized by: Yordan Rivera D.O.    Location:  OR  Urgency:  Elective  Indications for Airway Management:  Anesthesia      Spontaneous Ventilation: absent    Sedation Level:  Deep  Preoxygenated: Yes    Patient Position:  Sniffing  MILS Maintained Throughout: No    Mask Difficulty Assessment:  0 - not attempted  Final Airway Type:  Supraglottic airway  Final Supraglottic Airway:  Standard LMA    SGA Size:  5  Number of Attempts at Approach:  1  Ventilation Between Attempts:  None  Number of Other Approaches Attempted:  0

## 2024-06-20 NOTE — ANESTHESIA PREPROCEDURE EVALUATION
" Case: [id number] Date/Time: 06/20/24 0845    Procedure: INCISION AND DRAINAGE KNEE (Left)    Location[place] [address] / SURGERY [facility]    Surgeons: CINTHYA Mott H&P:  PAST MEDICAL HISTORY:   58 y.o. male who presents for Procedure(s) (LRB):  INCISION AND DRAINAGE KNEE (Left).  He has current and past medical problems significant for:    History reviewed. No pertinent past medical history.    SMOKING/ALCOHOL/RECREATIONAL DRUG USE:  Social History     Tobacco Use    Smoking status: Never    Smokeless tobacco: Never   Vaping Use    Vaping status: Every Day    Substances: Nicotine   Substance Use Topics    Alcohol use: Never    Drug use: Never     Social History     Substance and Sexual Activity   Drug Use Never       PAST SURGICAL HISTORY:  History reviewed. No pertinent surgical history.    ALLERGIES:   Allergies   Allergen Reactions    Cefazolin Rash     Urticarial rash    Cephalexin Rash     Urticarial Rash       MEDICATIONS:  No current facility-administered medications on file prior to encounter.     Current Outpatient Medications on File Prior to Encounter   Medication Sig Dispense Refill    metaxalone (SKELAXIN) 800 MG Tab Take 800 mg by mouth 3 times a day. FOR 30 DAYS      ibuprofen (MOTRIN) 200 MG Tab Take 800 mg by mouth every 8 hours as needed for Mild Pain.      NON SPECIFIED Apply 1 Application topically 1 time a day as needed (pain). \"CBD rub\"         LABS:  Lab Results   Component Value Date/Time    HEMOGLOBIN 14.7 06/19/2024 0634    HEMATOCRIT 41.7 (L) 06/19/2024 0634    WBC 11.0 (H) 06/19/2024 0634     Lab Results   Component Value Date/Time    SODIUM 138 06/19/2024 0634    POTASSIUM 3.8 06/19/2024 0634    CHLORIDE 104 06/19/2024 0634    CO2 21 06/19/2024 0634    GLUCOSE 98 06/19/2024 0634    BUN 10 06/19/2024 0634    CALCIUM 8.8 06/19/2024 0634         PREVIOUS ANESTHETICS: See EMR  __________________________________________    Relevant Problems   Other   (positive) " Cellulitis       Physical Exam    Airway   Mallampati: II  TM distance: >3 FB  Neck ROM: full       Cardiovascular - normal exam  Rhythm: regular  Rate: normal  (-) murmur     Dental - normal exam           Pulmonary - normal exam  Breath sounds clear to auscultation     Abdominal    Neurological - normal exam                   Anesthesia Plan    ASA 2       Plan - general       Airway plan will be LMA          Induction: intravenous    Postoperative Plan: Postoperative administration of opioids is intended.    Pertinent diagnostic labs and testing reviewed    Informed Consent:    Anesthetic plan and risks discussed with patient.    Use of blood products discussed with: patient whom consented to blood products.

## 2024-06-20 NOTE — DISCHARGE SUMMARY
[facility] DISCHARGE SUMMARY     PATIENT ID:  Name:             Alton Koehler   YOB: 1965  Age:                 58 y.o.  male   MRN:               [id number]  Address:         [address] [place] [address] [place] [address]  Phone:            There is no home phone number on file.    ADMISSION DATE:   6/16/2024    DISCHARGE DATE:   6/20/2024    DISCHARGE DIAGNOSES:   Primary Diagnoses:  Cellulitis-left shin    Secondary Diagnoses:   Hypersensitivity reaction/rash    ATTENDING PHYSICIAN:   Josselyn Boss MD    RESIDENT:   DO Kimmy Sandhu MD    CONSULTANTS:    Orthopedic surgery    PROCEDURES:    Surgical incision and drainage, left knee prepatellar bursectomy.  On 6/20.    IMAGING:   CT-EXTREMITY, LOWER WITH LEFT   Final Result         1. Subcutaneous edema. This may be seen with cellulitis.   2. Peripherally enhancing fluid collection in the anterior subcutaneous tissues overlying the proximal tibia may represent an abscess. This was also noted on the prior ultrasound.      US-EXTREMITY NON VASCULAR UNILATERAL LEFT   Final Result      Complex fluid collection in the anterior left lower leg soft tissues measuring 8.6 x 3.8 x 1.6 cm in diameter. See the discussion above.      DX-TIBIA AND FIBULA LEFT   Final Result      1.  No acute osseous abnormality detected. Diffuse soft tissue swelling.        PHYSICAL EXAM:   General: No acute distress, afebrile, resting comfortably  HEENT: NC/AT. EOMI.   Cardiovascular: RRR without murmurs. Normal capillary refill   Respiratory: CTAB, no tachypnea or retractions  Abdomen: soft, nontender, nondistended, no masses  EXT:  AZEVEDO, left leg with Ace bandages and gauze, decreased swelling and erythema with postop sutures and Xeroform overlying left shin incision.  Skin: See extremities, maculopapular rash on arms, legs and chest.  Neuro: Non-focal    LABS:  Recent Labs     06/18/24  0231 06/19/24  0634   WBC 10.2 11.0*   RBC 4.60* 4.81   HEMOGLOBIN 14.1  "14.7   HEMATOCRIT 39.7* 41.7*   MCV 86.3 86.7   MCH 30.7 30.6   RDW 39.4 39.6   PLATELETCT 241 261   MPV 10.6 9.8   NEUTSPOLYS 80.30*  --    LYMPHOCYTES 8.50*  --    MONOCYTES 8.70  --    EOSINOPHILS 1.60  --    BASOPHILS 0.40  --      Recent Labs     06/18/24  0231 06/19/24  0634   SODIUM 139 138   POTASSIUM 3.8 3.8   CHLORIDE 105 104   CO2 22 21   GLUCOSE 118* 98   BUN 11 10     No results found for: \"CHOLSTRLTOT\", \"LDL\", \"HDL\", \"TRIGLYCERIDE\"    No results found for: \"TROPONINI\", \"CKMB\"  No results found for: \"TROPONINI\", \"CKMB\"         HOSPITAL COURSE:   Alton Koehler is a 58 y.o. male presented to the ER on 6/16/2024 with a 4-day history of increasing left leg erythema, edema, tenderness with associated fever, chills and mild nausea.  Patient stated he hit his left shin on a coffee table while moving things about 4 days prior without skin break.  Patient denied any numbness, tingling, calf pain, palpitations, drainage, bleeding or weakness.  Left leg x-ray showed only diffuse soft tissue swelling, no osseous abnormalities.  Patient was started on Ancef and given fluids.  MRSA nares returned negative.  Patient developed a urticarial rash the next day, Benadryl given and transition to Keflex.  Rash continued patient was switched to Bactrim and rash still continued without oral swelling or respiratory issues..  On 6/17 ultrasound of left leg showed fluid collection.  On 6/19 symptoms persisted thus CT was ordered which showed fluid collection, possible abscess.  Ortho was consulted and patient underwent surgical I&D (prepatellar bursectomy) on 6/20 and was discharged in stable condition on doxycycline with instructions for close outpatient follow-up with PCP and Ortho, and ER precautions.    * Cellulitis- (present on admission)  Assessment & Plan  4-day history of slowly spreading erythema and edema from left tibial tuberosity after bumping area on table, associated with fevers, chills, nausea with leukocytosis " on admission and elevated CRP.  Suspect at this time most likely left lower extremity cellulitis, low concern for DVT given no calf pain or discoloration and no recent immobilization, also low concern for compartment syndrome.  Left tibia/fibula x-ray showed no osseous abnormality, evidence of soft tissue swelling.  Leukocytosis improving with antibiotics.  MRSA nares negative.  Patient mildly tachycardic on admission however has had stable vital signs since then.  -Patient initiated on IV Ancef 2 g every 8 hours for 10-day total course on admission.  Switched to oral Cephalexin 6/17 given most likely hypersensitivity reaction.  Due to continued presence of maculopapular rash, switch to oral Bactrim 6/18 for 10-day total course.  - Blood cultures taken in ED show no growth to date.  - Soft tissue ultrasound ordered, showed 8.6 x 3.8 x 1.6 cm complicated fluid collection  - Due to concern for possible abscess and need for I&D, orthopedic surgery consulted, S/P I&D on 6/20 with drainage of purulent fluid.  - Discharged on 14-day course of doxycycline, patient instructed to have close follow-up with PCP and Ortho        Hypersensitivity reaction  Assessment & Plan  Patient with continued maculopapular rash noted over bilateral upper extremities, chest, left lower extremity after initiation of Ancef for left lower extremity cellulitis.  Suspicion at this time is for hypersensitivity reaction to Ancef.  Discussed with pharmacy.  - Continue Benadryl IV 25 mg every 6 hours as needed for rash  - Due to continued rash while on cephalexin, switched to oral Bactrim.  Patient on doxycycline, patient will continue to treat rash with Benadryl unless he develops respiratory issues or oral swelling, then will be seen by urgent care or ER.       DISCHARGE CONDITION:    Stable    DISPOSITION:   Home     DISCHARGE MEDICATIONS:      Medication List        START taking these medications        Instructions   acetaminophen 325 MG  "Tabs  Commonly known as: Tylenol   Take 2 Tablets by mouth every 6 hours as needed for Mild Pain.  Dose: 650 mg     aspirin 325 MG Tabs  Commonly known as: Asa   Doctor's comments: To be take until discontinued by ortho  Take 1 Tablet by mouth 2 times a day.  Dose: 325 mg     doxycycline 100 MG capsule  Commonly known as: Monodox   Take 1 Capsule by mouth 2 times a day for 14 days.  Dose: 100 mg            CONTINUE taking these medications        Instructions   ibuprofen 200 MG Tabs  Commonly known as: Motrin   Take 800 mg by mouth every 8 hours as needed for Mild Pain.  Dose: 800 mg     metaxalone 800 MG Tabs  Commonly known as: Skelaxin   Take 800 mg by mouth 3 times a day. FOR 30 DAYS  Dose: 800 mg     NON SPECIFIED   Apply 1 Application topically 1 time a day as needed (pain). \"CBD rub\"  Dose: 1 Application                ACTIVITY:   Normal Activity as Tolerated.    DIET:   Healthy    DISCHARGE INSTRUCTIONS AND FOLLOW UP:  Patient is medically stable for discharge and will be discharged to home    Follow Up: PCP: Dr. Mcneal, orthopedic surgery ([facility])    Discharge Instructions:   Patient was instructed to return the ER in the event of worsening symptoms including but not limited to Pain, increased swelling, significant bleeding, fever, syncope or any other major concerns. Patient understands that failure to do so may indicate worsening of his medical condition(s) and result in adverse clinical outcomes including fatality. We have counseled the patient on the importance of compliance and the patient has agreed to proceed with all medical recommendations and follow up plan indicated above. The patient understands that the failure to do so may result in result in adverse clinical outcomes including fatality.       CC:   Arthur Mcneal M.D.  [facility]- Dr. Alfred        "

## 2024-06-20 NOTE — CONSULTS
"6/20/2024    The patient was seen at the request of Dr Boss    HPI: Alton Koehler is a 58 y.o. male who presents with complaints of pain to left knee.  This started days ago after unknown trauma.  The pain is 5/10 and is described as sharp.  The pain is made worse by palpation of the area and made better by rest and immobilization.    History reviewed. No pertinent past medical history.    History reviewed. No pertinent surgical history.    Medications  No current facility-administered medications on file prior to encounter.     Current Outpatient Medications on File Prior to Encounter   Medication Sig Dispense Refill    metaxalone (SKELAXIN) 800 MG Tab Take 800 mg by mouth 3 times a day. FOR 30 DAYS      ibuprofen (MOTRIN) 200 MG Tab Take 800 mg by mouth every 8 hours as needed for Mild Pain.      NON SPECIFIED Apply 1 Application topically 1 time a day as needed (pain). \"CBD rub\"         Allergies  Cefazolin and Cephalexin    ROS  Left knee pain. All other systems were reviewed and found to be negative    History reviewed. No pertinent family history.    Social History     Socioeconomic History    Marital status:    Tobacco Use    Smoking status: Never    Smokeless tobacco: Never   Vaping Use    Vaping status: Every Day    Substances: Nicotine   Substance and Sexual Activity    Alcohol use: Never    Drug use: Never     Social Determinants of Health     Food Insecurity: No Food Insecurity (6/17/2024)    Hunger Vital Sign     Worried About Running Out of Food in the Last Year: Never true     Ran Out of Food in the Last Year: Never true   Transportation Needs: No Transportation Needs (6/17/2024)    PRAPARE - Transportation     Lack of Transportation (Medical): No     Lack of Transportation (Non-Medical): No   Intimate Partner Violence: Not At Risk (6/16/2024)    Humiliation, Afraid, Rape, and Kick questionnaire     Fear of Current or Ex-Partner: No     Emotionally Abused: No     Physically Abused: No    "  Sexually Abused: No   Housing Stability: Low Risk  (6/17/2024)    Housing Stability Vital Sign     Unable to Pay for Housing in the Last Year: No     Number of Places Lived in the Last Year: 1     Unstable Housing in the Last Year: No       Physical Exam  Vitals  BP (!) 142/64   Pulse 79   Temp 35.9 °C (96.7 °F) (Temporal)   Resp 16   Ht 1.829 m (6')   Wt 119 kg (262 lb 5.6 oz)   SpO2 95%   General: Well Developed, Well Nourished, Age appropriate appearance  HEENT: Normocephalic, atraumatic  Psych: Normal mood and affect  Neck: Supple, nontender, no masses  Lungs: Breathing unlabored, No audible wheezing  Heart: Regular heart rate and rhythm  Abdomen: Soft, NT, ND  Neuro: Sensation grossly intact to BUE and BLE, moving all four extremities  Skin: Intact, no open wounds  Vascular: 2+DP/PT, Capillary refill <2 seconds  MSK: Massive swollen left knee with erythema      Radiographs:  CT-EXTREMITY, LOWER WITH LEFT   Final Result         1. Subcutaneous edema. This may be seen with cellulitis.   2. Peripherally enhancing fluid collection in the anterior subcutaneous tissues overlying the proximal tibia may represent an abscess. This was also noted on the prior ultrasound.      US-EXTREMITY NON VASCULAR UNILATERAL LEFT   Final Result      Complex fluid collection in the anterior left lower leg soft tissues measuring 8.6 x 3.8 x 1.6 cm in diameter. See the discussion above.      DX-TIBIA AND FIBULA LEFT   Final Result      1.  No acute osseous abnormality detected. Diffuse soft tissue swelling.          Laboratory Values  Recent Labs     06/18/24  0231 06/19/24  0634   WBC 10.2 11.0*   RBC 4.60* 4.81   HEMOGLOBIN 14.1 14.7   HEMATOCRIT 39.7* 41.7*   MCV 86.3 86.7   MCH 30.7 30.6   MCHC 35.5 35.3   RDW 39.4 39.6   PLATELETCT 241 261   MPV 10.6 9.8     Recent Labs     06/18/24  0231 06/19/24  0634   SODIUM 139 138   POTASSIUM 3.8 3.8   CHLORIDE 105 104   CO2 22 21   GLUCOSE 118* 98   BUN 11 10              Impression:Left septic prepatellar bursitis    Plan:We discussed the diagnosis and findings with the patient at length.  We reviewed possible non operative and operative interventions and the risks and benefits of each of these.  he had a chance to ask questions and all of these were answered to his satisfaction. The patient chose to proceed with  bursectomy including all indicated procedures. Risks and benefits of surgery were discussed which include but are not limited to bleeding, infection, neurovascular damage, malunion, nonunion, instability, limb length discrepancy, DVT, PE, MI, Stroke and death. They understand these risks and wish to proceed.

## 2024-06-21 LAB
BACTERIA BLD CULT: NORMAL
BACTERIA BLD CULT: NORMAL
SIGNIFICANT IND 70042: NORMAL
SIGNIFICANT IND 70042: NORMAL
SITE SITE: NORMAL
SITE SITE: NORMAL
SOURCE SOURCE: NORMAL
SOURCE SOURCE: NORMAL

## 2024-06-22 LAB
BACTERIA WND AEROBE CULT: ABNORMAL
BACTERIA WND AEROBE CULT: ABNORMAL
GRAM STN SPEC: ABNORMAL
SIGNIFICANT IND 70042: ABNORMAL
SITE SITE: ABNORMAL
SOURCE SOURCE: ABNORMAL

## 2024-06-23 LAB
BACTERIA SPEC ANAEROBE CULT: NORMAL
SIGNIFICANT IND 70042: NORMAL
SITE SITE: NORMAL
SOURCE SOURCE: NORMAL

## 2024-06-26 LAB
FUNGUS SPEC CULT: NORMAL
FUNGUS SPEC FUNGUS STN: NORMAL
SIGNIFICANT IND 70042: NORMAL
SITE SITE: NORMAL
SOURCE SOURCE: NORMAL

## 2024-11-21 ENCOUNTER — OFFICE VISIT (OUTPATIENT)
Dept: URGENT CARE | Facility: CLINIC | Age: 59
End: 2024-11-21

## 2024-11-21 VITALS
OXYGEN SATURATION: 98 % | DIASTOLIC BLOOD PRESSURE: 68 MMHG | RESPIRATION RATE: 16 BRPM | HEART RATE: 100 BPM | TEMPERATURE: 97.1 F | HEIGHT: 72 IN | BODY MASS INDEX: 34.67 KG/M2 | WEIGHT: 256 LBS | SYSTOLIC BLOOD PRESSURE: 116 MMHG

## 2024-11-21 DIAGNOSIS — Z02.89 ENCOUNTER FOR EXAMINATION REQUIRED BY DEPARTMENT OF TRANSPORTATION (DOT): ICD-10-CM

## 2024-11-21 PROCEDURE — 7100 PR DOT PHYSICAL: Performed by: NURSE PRACTITIONER

## 2024-11-21 ASSESSMENT — FIBROSIS 4 INDEX: FIB4 SCORE: 1.047767037314321675

## 2024-11-21 NOTE — PROGRESS NOTES
See scanned history and physical.  Patient denies any history of seizures, dialysis, insulin use, pacemaker/defibrillator, syncope, arrhythmias/murmurs, vertigo/meniere's disease, illicit drug use, ETOH abuse, or any weakness/paresthesias to extremities.  Patient has a prescription for Skelaxin, instructed to refrain from taking at least 8 hours prior to driving.  Certified for two years, most wear corrective lenses. Follow up regularly with PCP.  As a side note, urine dip in clinic notes ketones, patient states he currently is on a ketogenic diet.  He is instructed follow-up with PCP regarding.        ANGIE Gilbert.

## (undated) DEVICE — COVER LIGHT HANDLE ALC PLUS DISP (18EA/BX)

## (undated) DEVICE — SUCTION INSTRUMENT YANKAUER BULBOUS TIP W/O VENT (50EA/CA)

## (undated) DEVICE — SUTURE GENERAL

## (undated) DEVICE — SET EXTENSION WITH 2 PORTS (48EA/CA) ***PART #2C8610 IS A SUBSTITUTE*****

## (undated) DEVICE — CHLORAPREP 26 ML APPLICATOR - ORANGE TINT(25/CA)

## (undated) DEVICE — CANISTER SUCTION 3000ML MECHANICAL FILTER AUTO SHUTOFF MEDI-VAC NONSTERILE LF DISP  (40EA/CA)

## (undated) DEVICE — SODIUM CHL IRRIGATION 0.9% 1000ML (12EA/CA)

## (undated) DEVICE — LACTATED RINGERS INJ 1000 ML - (14EA/CA 60CA/PF)

## (undated) DEVICE — SET LEADWIRE 5 LEAD BEDSIDE DISPOSABLE ECG (1SET OF 5/EA)

## (undated) DEVICE — SENSOR OXIMETER ADULT SPO2 RD SET (20EA/BX)

## (undated) DEVICE — SPONGE GAUZESTER 4 X 4 4PLY - (128PK/CA)

## (undated) DEVICE — SLEEVE VASO CALF MED - (10PR/CA)

## (undated) DEVICE — PACK MINOR BASIN - (2EA/CA)

## (undated) DEVICE — GOWN WARMING STANDARD FLEX - (30/CA)

## (undated) DEVICE — ELECTRODE DUAL RETURN W/ CORD - (50/PK)

## (undated) DEVICE — TUBING CLEARLINK DUO-VENT - C-FLO (48EA/CA)